# Patient Record
Sex: FEMALE | Race: WHITE
[De-identification: names, ages, dates, MRNs, and addresses within clinical notes are randomized per-mention and may not be internally consistent; named-entity substitution may affect disease eponyms.]

---

## 2017-02-04 ENCOUNTER — HOSPITAL ENCOUNTER (EMERGENCY)
Dept: HOSPITAL 68 - ERH | Age: 57
End: 2017-02-04
Payer: COMMERCIAL

## 2017-02-04 VITALS — WEIGHT: 155 LBS | BODY MASS INDEX: 23.49 KG/M2 | HEIGHT: 68 IN

## 2017-02-04 VITALS — DIASTOLIC BLOOD PRESSURE: 70 MMHG | SYSTOLIC BLOOD PRESSURE: 130 MMHG

## 2017-02-04 DIAGNOSIS — Z72.0: ICD-10-CM

## 2017-02-04 DIAGNOSIS — J40: Primary | ICD-10-CM

## 2017-02-04 NOTE — ED INFLUENZA/URI COMPLAINT
History of Present Illness
 
General
Chief Complaint: Upper Respiratory Sx/Fever
Stated Complaint: FLU SYMPTOMS
Source: patient
Exam Limitations: no limitations
 
Vital Signs & Intake/Output
Vital Signs & Intake/Output
 Vital Signs
 
 
Date Time Temp Pulse Resp B/P Pulse O2 O2 Flow FiO2
 
     Ox Delivery Rate 
 
02/04 1738 98.6 98 18 130/70 98 Room Air  
 
02/04 1653     98 Room Air  
 
02/04 1635 98.5 110 16 138/96 97 Room Air  
 
 
Allergies
Coded Allergies:
buspirone (From BUSPAR) (Intermediate, PER PT UNKNOWN STATES DOES NOT EXPERIENCE
DYSPNEA 12/08/16)
 
Reconcile Medications
Aripiprazole 2 MG TABLET   0.25 TAB PO DAILY MENTAL HEALTH  (Reported)
Azithromycin (Zithromax) 250 MG TABLET   1 DP PO AD bronchitis
     1 tab daily x 4 days
Clonidine HCl 0.1 MG TABLET   1 TAB PO TID PRN UNKNOWN  (Reported)
Diphenhydramine HCl 25 MG CAPSULE   1-2 CAP PO AD ALLERGIES/SLEEP  (Reported)
Guaifen/Phenyleph/Acetaminophn (Mucinex Fast-Max Cold-Sinus Tb) 200 MG-5 MG-325 
MG TABLET   2 CAP PO Q4H PRN COLD & SINUS  (Reported)
Ibuprofen 600 MG TABLET   1 TAB PO Q6 PRN PAIN
     with food
Methimazole 10 MG TABLET   1 TAB PO DAILY THYROID  (Reported)
Neomycn/Baci Zn/Pmyx Bs/Pramox (Triple Antibioti-Pain Rlf Oint) 3.5-10K-10 
OINT...G.   1 PETER TOP AD AFFECTED AREA(S)  (Reported)
Pantoprazole Sodium 40 MG TABLET.DR   1 TAB PO DAILY GI  (Reported)
Propranolol HCl 20 MG TABLET   1 TAB PO BID THYROID  (Reported)
Quetiapine Fumarate 200 MG TABLET   1 TAB PO QPM SLEEP HELP  (Reported)
Quetiapine Fumarate (Seroquel) 50 MG TABLET   0.5-1 TAB PO TID PRN MENTAL HEALTH
 (Reported)
Topiramate 50 MG TABLET   1 TAB PO TID MIGRAINES  (Reported)
 
Triage Note:
PT STATES SHE HAS FLU LIKE S/S SINCE YESTERDAY.
 PT STATES SHE HAS ACHES, COLD AND SORE THROAT
 AND IS FEELING DIZZY
Triage Nurses Notes Reviewed? yes
Onset: Abrupt
Duration: day(s): (2)
Timing: multiple episodes today
Severity: moderate
No Modifying Factors: none
Associated Symptoms: cough, sore throat, BODY PAIN, DYSPNEA
HPI:
This is a 56-year-old female presents to the ER with chief complaint of 
congestion, sore throat, cough, body aches and pains for the past 2 days.  She 
states she was also moving and states that her back pain may be secondary to 
that.  Denies any fevers.  She is a daily smoker.  Didn't is unsure about sick 
contacts.  She took some over-the-counter allergy medications without any 
relief.  She is coughing up green sputum.
 
Past History
 
Travel History
Traveled to [place] past 21 day No
 
Medical History
Any Pertinent Medical History? see below for history
Neurological: NONE
EENT: NONE
Cardiovascular: hypertension, hyperlipidemia
Respiratory: NONE
Gastrointestinal: NONE
Hepatic: NONE
Renal: NONE (HAD PRIOR STENT), RENAL STONES
Musculoskeletal: NONE
Psychiatric: anxiety, bipolar disease, substance abuse (Depakote overdose), PTSD
Endocrine: NONE
Blood Disorders: NONE
Cancer(s): NONE
GYN/Reproductive: PID
Other Medical Hx:
DERMATITIS CURRENT
History of MRSA: No
History of VRE: No
History of CDIFF: No
Influenza Vaccine: 10/01/15
 
Surgical History
Surgical History: none, non-contributory, N
 
Psychosocial History
Who do you live with Patient/Self
Services at Home None
What is your primary language English
Tobacco Use: Current Daily Use
Daily Tobacco Use Amount/Type: => 5 Cigarettes daily
ETOH Use: denies use
Illicit Drug Use: denies illicit drug use
 
Family History
Family History, If Any:
MOTHER
  FH: hypertension
  FHx: hyperthyroidism
FATHER
Relation not specified for:
  Kidney stones
 
Hx Contributory? No
 
Review of Systems
 
Review of Systems
Constitutional:
Reports: chills.  Denies: fever. 
EENTM:
Reports: no symptoms. 
Respiratory:
Reports: cough, sputum production.  Denies: short of breath. 
Cardiovascular:
Denies: chest pain, palpitations. 
GI:
Reports: no symptoms. 
Genitourinary:
Reports: no symptoms. 
Musculoskeletal:
Reports: back pain, muscle pain. 
Skin:
Reports: no symptoms. 
Neurological/Psychological:
Reports: no symptoms. 
Hematologic/Endocrine:
Denies: bruising, bleeding, polyuria, polydipsia. 
Immunologic/Allergic:
Denies: splenectomy. 
All Other Systems: Reviewed and Negative
 
Physical Exam
 
Physical Exam
General Appearance: well developed/nourished, alert, awake
Head: atraumatic, normal appearance
Eyes:
Bilateral: normal appearance, PERRL, EOMI. 
Ears, Nose, Throat: moist mucous membrane, hearing grossly normal, Tympanic 
normal, pharyngeal erythema
Neck: normal inspection, supple, full range of motion
Respiratory: normal breath sounds, chest non-tender
Cardiovascular: regular rate/rhythm
Peripheral Pulses:
2+ radial (R), 2+ radial (L)
Gastrointestinal: soft
Neurologic/Psych: no motor/sensory deficits, awake, alert, oriented x 3
Skin: intact, normal color, warm/dry
 
Core Measures
Severe Sepsis Present: No
Septic Shock Present: No
 
Progress
Differential Diagnosis: influenza, pneumonia, pharyngitis
Plan of Care:
 Orders
 
 
Procedure Date/time Status
 
THROAT CULTURE W/QUICK STREP 02/04 1657 Active
 
URINALYSIS 02/04 1647 Complete
 
RAPID VIRAL INFLUENZA A 02/04 1638 Complete
 
 
 Laboratory Tests
 
 
 
02/04/17 1648:
Urinalysis LIGHT  H, Urine Color YEL, Urine Clarity HAZY  H, Urine pH 6.5, Ur 
Specific Gravity 1.010, Urine Protein NEG, Urine Ketones NEG, Urine Nitrite NEG,
Urine Bilirubin NEG, Urine Urobilinogen 0.2, Ur Leukocyte Esterase SMALL  H, Ur 
Microscopic SEDIMENT EXAMINED, Urine RBC RARE, Urine WBC 1-3  H, Ur Epithelial 
Cells MOD  H, Urine Mucus FEW, Urine Hemoglobin TRACE-INTACT, Urine Glucose NEG
Initial ED EKG: none
 
Departure
 
Departure
Time of Disposition: 1726
Disposition: HOME OR SELF CARE
Condition: Stable
Clinical Impression
Primary Impression: Bronchitis
Referrals:
VALDEZ FERNANDEZ,MYL (PCP/Family)
 
Additional Instructions:
Take the azithromycin as directed.  Motrin as needed for pain/fever.  Follow up 
with your doctor in the office.  Stop smoking.  
Departure Forms:
Customer Survey
General Discharge Information
Prescriptions:
Current Visit Scripts
Azithromycin (Zithromax) 1 DP PO AD 
     #4 TAB 
     1 tab daily x 4 days
 
Ibuprofen 1 TAB PO Q6 PRN PAIN
     #30 TAB 
     with food

## 2017-02-08 ENCOUNTER — HOSPITAL ENCOUNTER (EMERGENCY)
Dept: HOSPITAL 68 - ERH | Age: 57
End: 2017-02-08
Payer: COMMERCIAL

## 2017-02-08 VITALS — WEIGHT: 155 LBS | HEIGHT: 68 IN | BODY MASS INDEX: 23.49 KG/M2

## 2017-02-08 VITALS — SYSTOLIC BLOOD PRESSURE: 159 MMHG | DIASTOLIC BLOOD PRESSURE: 100 MMHG
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
## 2017-02-08 DIAGNOSIS — S60.411A: Primary | ICD-10-CM
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
## 2017-02-11 ENCOUNTER — HOSPITAL ENCOUNTER (EMERGENCY)
Dept: HOSPITAL 68 - ERH | Age: 57
LOS: 1 days | End: 2017-02-12
Payer: COMMERCIAL

## 2017-02-11 DIAGNOSIS — F31.9: Primary | ICD-10-CM

## 2017-02-11 LAB
ABSOLUTE GRANULOCYTE CT: 4.6 /CUMM (ref 1.4–6.5)
BASOPHILS # BLD: 0.1 /CUMM (ref 0–0.2)
BASOPHILS NFR BLD: 1.2 % (ref 0–2)
EOSINOPHIL # BLD: 0 /CUMM (ref 0–0.7)
EOSINOPHIL NFR BLD: 0.2 % (ref 0–5)
ERYTHROCYTE [DISTWIDTH] IN BLOOD BY AUTOMATED COUNT: 23.5 % (ref 11.5–14.5)
GRANULOCYTES NFR BLD: 62.2 % (ref 42.2–75.2)
HCT VFR BLD CALC: 39.6 % (ref 37–47)
LITHIUM SERPL-SCNC: 0.4 MMOL/L (ref 0.6–1.2)
LYMPHOCYTES # BLD: 2.4 /CUMM (ref 1.2–3.4)
MCH RBC QN AUTO: 27.7 PG (ref 27–31)
MCHC RBC AUTO-ENTMCNC: 32.8 G/DL (ref 33–37)
MCV RBC AUTO: 84.4 FL (ref 81–99)
MONOCYTES # BLD: 0.3 /CUMM (ref 0.1–0.6)
PLATELET # BLD: 250 /CUMM (ref 130–400)
PMV BLD AUTO: 8.8 FL (ref 7.4–10.4)
RED BLOOD CELL CT: 4.69 /CUMM (ref 4.2–5.4)
WBC # BLD AUTO: 7.4 /CUMM (ref 4.8–10.8)

## 2017-02-11 PROCEDURE — G0463 HOSPITAL OUTPT CLINIC VISIT: HCPCS

## 2017-02-11 PROCEDURE — G0480 DRUG TEST DEF 1-7 CLASSES: HCPCS
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
## 2017-02-11 NOTE — ED DYSPNEA/ASTHMA COMPLAINT
**See Addendum**
History of Present Illness
 
General
Chief Complaint: Psychiatric Related Complaint
Stated Complaint: PARANOIA
Source: patient, old records, EMS
Exam Limitations: no limitations
 
Vital Signs & Intake/Output
Vital Signs & Intake/Output
 Vital Signs
 
 
Date Time Temp Pulse Resp B/P Pulse O2 O2 Flow FiO2
 
     Ox Delivery Rate 
 
02/11 1730      Room Air  
 
02/11 1552 98.2 100 16 151/95 94 Room Air  
 
 
Allergies
Coded Allergies:
buspirone (From BUSPAR) (Intermediate, PER PT UNKNOWN STATES DOES NOT EXPERIENCE
DYSPNEA 12/08/16)
 
Reconcile Medications
Aripiprazole 2 MG TABLET   0.25 TAB PO DAILY MENTAL HEALTH  (Reported)
Azithromycin (Zithromax) 250 MG TABLET   1 DP PO AD bronchitis
     1 tab daily x 4 days
Clonidine HCl 0.1 MG TABLET   1 TAB PO TID PRN UNKNOWN  (Reported)
Diphenhydramine HCl 25 MG CAPSULE   1-2 CAP PO AD ALLERGIES/SLEEP  (Reported)
Guaifen/Phenyleph/Acetaminophn (Mucinex Fast-Max Cold-Sinus Tb) 200 MG-5 MG-325 
MG TABLET   2 CAP PO Q4H PRN COLD & SINUS  (Reported)
Ibuprofen 600 MG TABLET   1 TAB PO Q6 PRN PAIN
     with food
Methimazole 10 MG TABLET   1 TAB PO DAILY THYROID  (Reported)
Neomycn/Baci Zn/Pmyx Bs/Pramox (Triple Antibioti-Pain Rlf Oint) 3.5-10K-10 
OINT...G.   1 PETER TOP AD AFFECTED AREA(S)  (Reported)
Pantoprazole Sodium 40 MG TABLET.DR   1 TAB PO DAILY GI  (Reported)
Propranolol HCl 20 MG TABLET   1 TAB PO BID THYROID  (Reported)
Quetiapine Fumarate 200 MG TABLET   1 TAB PO QPM SLEEP HELP  (Reported)
Quetiapine Fumarate (Seroquel) 50 MG TABLET   0.5-1 TAB PO TID PRN MENTAL HEALTH
 (Reported)
Topiramate 50 MG TABLET   1 TAB PO TID MIGRAINES  (Reported)
 
Core Measure Meds Pre-Hospital antibiotics
Triage Note:
PT BIBA FOR PARANOIA AND +AH/VH. PT CALM AND
COOPERATIVE. PER EMS, PT HAS SHINGLES ON HER UPPER
LIP.
Triage Nurses Notes Reviewed? yes
Onset: Just prior to arrival
Duration: day(s):, constant, continues in ED
Timing: recent history
Severity: moderate
Activities at Onset: emotional stress
Prior Episodes/Possible Cause: unknown cause
Associated Symptoms: anxiety, insomnia, loss of appetite
LMP (ages 10-50): post menopausal
Pregnant: No
Patient currently breastfeeds: No
HPI:
Patient complains of being depressed and scared of roommate reportedly entering 
her room.  She also has insomnia loss appetite and hears voices.
She denies fever chills nausea vomiting diarrhea abdominal pain chest pain chest
breath headache dysuria bleeding suicidal ideation homicidal ideation.
 
Past History
 
Medical History
Any Pertinent Medical History? see below for history
Neurological: NONE
EENT: NONE
Cardiovascular: hypertension, hyperlipidemia
Respiratory: NONE
Gastrointestinal: NONE
Hepatic: NONE
Renal: RENAL STONES W/ STENTS
Musculoskeletal: NONE
Psychiatric: anxiety, bipolar disease, substance abuse (Depakote overdose), PTSD
Endocrine: NONE
Blood Disorders: NONE
Cancer(s): NONE
GYN/Reproductive: PID
History of MRSA: No
History of VRE: No
History of CDIFF: No
 
Surgical History
Surgical History: none, non-contributory, N
 
Psychosocial History
Who do you live with Patient/Self
Services at Home None
What is your primary language English
 
Family History
Family History, If Any:
MOTHER
  FH: hypertension
  FHx: hyperthyroidism
FATHER
Relation not specified for:
  Kidney stones
 
Hx Contributory? No
 
Review of Systems
 
Review of Systems
Constitutional:
Reports: no symptoms. 
EENTM:
Reports: no symptoms. 
Respiratory:
Reports: no symptoms. 
Cardiovascular:
Reports: no symptoms. 
GI:
Reports: no symptoms. 
Genitourinary:
Reports: no symptoms. 
Musculoskeletal:
Reports: no symptoms. 
Skin:
Reports: see HPI, rash. 
Neurological/Psychological:
Reports: see HPI, anxiety, confusion. 
Hematologic/Endocrine:
Reports: no symptoms. 
Immunologic/Allergic:
Reports: no symptoms. 
All Other Systems: Reviewed and Negative
 
Physical Exam
 
Physical Exam
General Appearance: well developed/nourished, alert, awake, anxious, mild 
distress
Head: atraumatic, normal appearance
Eyes:
Bilateral: normal appearance, PERRL, EOMI. 
Ears, Nose, Throat: normal pharynx, normal ENT inspection
Neck: normal inspection, supple, full range of motion, no midline tenderness
Respiratory: normal breath sounds, chest non-tender, no respiratory distress, 
quiet respiration, lungs clear
Cardiovascular: regular rate/rhythm, normal peripheral pulses, norml femoral 
pulses equa
Peripheral Pulses:
4+ carotid (R), 4+ carotid (L)
Gastrointestinal: normal bowel sounds, soft, non-tender, no organomegaly
Extremities: normal inspection, normal capillary refill, normal range of motion,
no edema
Neurologic/Psych: no motor/sensory deficits, awake, alert, oriented x 3, CNs II-
XII nml as tested
Skin: intact, normal color, warm/dry, rash, patches of erythematous rash on skin
on upper lip philtrum area
Lymphatic: no anterior cervical murray
 
Core Measures
ACS in differential dx? No
Severe Sepsis Present: No
Septic Shock Present: No
 
Progress
Differential Diagnosis: psychosis depression
Plan of Care:
 Orders
 
 
Procedure Date/time Status
 
Regular Diet 02/12 D Active
 
Patient Safety Monitor 02/11 1526 Active
 
URINE DRUG SCREEN FOR ER ONLY 02/11 1526 Complete
 
URINALYSIS 02/11 1526 Complete
 
LITHIUM 02/11 1526 Complete
 
ETHANOL 02/11 1526 Complete
 
COMPREHENSIVE METABOLIC PANEL 02/11 1526 Complete
 
CBC WITHOUT DIFFERENTIAL 02/11 1526 Complete
 
ED CRISIS PSYCH CONSULT 02/11 1526 Active
 
 
 Current Medications
 
 
  Sig/Jerel Start time  Last
 
Medication Dose  Stop Time Status Admin
 
Omeprazole 40 MG DAILY AC 02/12 0700 UNVr 
 
(Prilosec)   02/12 0701  
 
Propranolol HCl 20 MG BID 02/11 2200 UNVr 
 
(Inderal 20 MG    02/12 1001  
 
Tablet)     
 
Lithium Carbonate 300 MG 0800,2000 02/11 2000 UNVr 
 
(Lithium Carbonate)   02/12 2001  
 
Diphenhydramine HCl 25 MG AT BEDTIME PRN 02/11 1800 UNVr 
 
(Benadryl)   02/11 1801  
 
Ibuprofen 600 MG 4 TIMES/DAY PRN 02/11 1800 UNVr 
 
(Motrin)   02/12 2201  
 
Topiramate 50 MG TID 02/11 1755 UNVr 
 
(Topamax)   02/12 1601  
 
Quetiapine Fumarate 50 MG TID 02/11 1754 UNVr 
 
(SEROquel)   02/12 2201  
 
Clonidine 0.1 MG TID 02/11 1751 UNVr 
 
(Catapres)   02/12 2201  
 
 
 Laboratory Tests
 
 
 
02/11/17 1610:
Urine Opiates Screen < 100.00, Methadone Screen 57, Barbiturate Screen < 60, Ur 
Phencyclidine Scrn 7.70, Amphetamines Screen < 100, U Benzodiazepines Scrn < 85,
Urine Cocaine Screen < 50, Urine Cannabis Screen < 5.00, Urine Color YEL, Urine 
Clarity CLEAR, Urine pH 6.0, Ur Specific Gravity 1.010, Urine Protein NEG, Urine
Ketones NEG, Urine Nitrite NEG, Urine Bilirubin NEG, Urine Urobilinogen 0.2, Ur 
Leukocyte Esterase NEG, Ur Microscopic EXAM NOT REQUIRED, Urine Hemoglobin NEG, 
Urine Glucose NEG
 
02/11/17 1541:
Anion Gap 9, Estimated GFR 47  L, BUN/Creatinine Ratio 13.3, Glucose 74, Calcium
9.6, Total Bilirubin 0.6, AST 26, ALT 34, Alkaline Phosphatase 229  H, Total 
Protein 6.7, Albumin 3.9, Globulin 2.8, Albumin/Globulin Ratio 1.4, CBC w Diff 
NO MAN DIFF REQ, RBC 4.69, MCV 84.4, MCH 27.7, RDW 23.5  H, MPV 8.8, Gran % 62.2
, Lymphocytes % 32.4, Monocytes % 4.0, Eosinophils % 0.2, Basophils % 1.2, 
Absolute Granulocytes 4.6, Absolute Lymphocytes 2.4, Absolute Monocytes 0.3, 
Absolute Eosinophils 0, Absolute Basophils 0.1, PUBS MCHC 32.8  L, Lithium 0.4  
L, Serum Alcohol < 10.0
Initial ED EKG: none
Hand-Off
   Endorsed To:
SAMANTHA FERNANDEZ,JW REICH
   Endorsed Time: 1900
   Pending: other (re eval in AM, female shelter)
 
Departure
 
Departure
Disposition: STILL A PATIENT
Condition: Stable
Clinical Impression
Primary Impression: Bipolar disorder
Qualifiers:  Active/Remission status: currently active Current bipolar episode 
type: depressed Current episode severity: unspecified Qualified Code: F31.30 - 
Bipolar disorder, current episode depressed, mild or moderate severity, 
unspecified
Referrals:
VALDEZ FERNANDEZ,MYAYE (PCP/Family)
 
Departure Forms:
Customer Survey
General Discharge Information
 
Critical Care Note
 
Critical Care Note
Critical Care Time: non-applicable

## 2017-02-12 VITALS — SYSTOLIC BLOOD PRESSURE: 135 MMHG | DIASTOLIC BLOOD PRESSURE: 95 MMHG

## 2017-04-04 ENCOUNTER — HOSPITAL ENCOUNTER (EMERGENCY)
Dept: HOSPITAL 68 - ERH | Age: 57
End: 2017-04-04
Payer: COMMERCIAL

## 2017-04-04 VITALS — DIASTOLIC BLOOD PRESSURE: 80 MMHG | SYSTOLIC BLOOD PRESSURE: 140 MMHG
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
## 2017-04-04 DIAGNOSIS — S32.010A: Primary | ICD-10-CM
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
## 2017-04-04 NOTE — RADIOLOGY REPORT
EXAMINATION:
XR LUMBOSACRAL SPINE
 
CLINICAL INFORMATION:
Back pain.
 
COMPARISON:
CT 11/16/2015
 
TECHNIQUE:
4 views of the lumbosacral spine were obtained.
 
FINDINGS:
Severe L5-S1 degenerative disc disease. Moderate degenerative disc disease at
L3-L4 with an inferior endplate erosion or Schmorl's node of L3 with
surrounding sclerosis. Slight L3-L4 retrolisthesis.
 
There is a superior endplate fracture of L1 with slight depression anteriorly
which appears chronic due to the presence of sclerosis.
 
IMPRESSION:
There is a superior endplate fracture of L1 with mild depression anteriorly
which appears chronic. However, this is new when compared with 11/16/2015.
Correlate for tenderness in this location.
 
Moderate L3-L4 degenerative disc disease with L3 inferior endplate Schmorl's
node or chronic erosion and surrounding sclerosis. Minimal retrolisthesis.
 
Severe L5-S1 degenerative disc disease.
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
## 2017-04-17 ENCOUNTER — HOSPITAL ENCOUNTER (EMERGENCY)
Dept: HOSPITAL 68 - ERH | Age: 57
Discharge: STILL ADMITTED OR ADMINISTRATIVE | End: 2017-04-17
Payer: COMMERCIAL

## 2017-04-17 VITALS — DIASTOLIC BLOOD PRESSURE: 95 MMHG | SYSTOLIC BLOOD PRESSURE: 169 MMHG

## 2017-04-17 VITALS — WEIGHT: 160 LBS | BODY MASS INDEX: 24.25 KG/M2 | HEIGHT: 68 IN

## 2017-04-17 DIAGNOSIS — S60.212A: Primary | ICD-10-CM

## 2017-05-01 ENCOUNTER — HOSPITAL ENCOUNTER (INPATIENT)
Dept: HOSPITAL 68 - ERH | Age: 57
LOS: 7 days | DRG: 885 | End: 2017-05-08
Attending: PSYCHIATRY & NEUROLOGY | Admitting: PSYCHIATRY & NEUROLOGY
Payer: COMMERCIAL

## 2017-05-01 VITALS — HEIGHT: 66 IN | WEIGHT: 144.12 LBS | BODY MASS INDEX: 23.16 KG/M2

## 2017-05-01 DIAGNOSIS — F17.200: ICD-10-CM

## 2017-05-01 DIAGNOSIS — Z87.898: ICD-10-CM

## 2017-05-01 DIAGNOSIS — F31.64: Primary | ICD-10-CM

## 2017-05-01 DIAGNOSIS — F43.10: ICD-10-CM

## 2017-05-01 LAB
ABSOLUTE GRANULOCYTE CT: 4.4 /CUMM (ref 1.4–6.5)
BASOPHILS # BLD: 0.1 /CUMM (ref 0–0.2)
BASOPHILS NFR BLD: 1.1 % (ref 0–2)
EOSINOPHIL # BLD: 0.1 /CUMM (ref 0–0.7)
EOSINOPHIL NFR BLD: 1.4 % (ref 0–5)
ERYTHROCYTE [DISTWIDTH] IN BLOOD BY AUTOMATED COUNT: 16.4 % (ref 11.5–14.5)
GRANULOCYTES NFR BLD: 65.6 % (ref 42.2–75.2)
HCT VFR BLD CALC: 40.3 % (ref 37–47)
LITHIUM SERPL-SCNC: 0.5 MMOL/L (ref 0.6–1.2)
LYMPHOCYTES # BLD: 1.9 /CUMM (ref 1.2–3.4)
MCH RBC QN AUTO: 31.6 PG (ref 27–31)
MCHC RBC AUTO-ENTMCNC: 33.9 G/DL (ref 33–37)
MCV RBC AUTO: 93.4 FL (ref 81–99)
MONOCYTES # BLD: 0.3 /CUMM (ref 0.1–0.6)
PLATELET # BLD: 157 /CUMM (ref 130–400)
PMV BLD AUTO: 9.5 FL (ref 7.4–10.4)
RED BLOOD CELL CT: 4.32 /CUMM (ref 4.2–5.4)
WBC # BLD AUTO: 6.6 /CUMM (ref 4.8–10.8)

## 2017-05-01 PROCEDURE — G0480 DRUG TEST DEF 1-7 CLASSES: HCPCS

## 2017-05-01 PROCEDURE — G0463 HOSPITAL OUTPT CLINIC VISIT: HCPCS
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
## 2017-05-01 NOTE — NUR
PT MEDICATED WITH SEROQUEL 50MG AND NEURONTIN 300MG PO PER EMAR. 
 
PT CALM AND COOPERATIVE, WATCHING TV IN ROOM 14. SITTER AT DOOR.

## 2017-05-01 NOTE — ED PSY CRISIS COLLATERAL NOTE
Collateral Note
 
Collateral Note
Family/Inform/Elissa Contacts:
Essie of [facility] called to inform that Pt was seen by mobile crisis and sent to
the ED on a SWEC. Pt has not been compliant with her tx or meds at [facility]. 
Sometimes she will take extra meds and other time not enough. Pt has been 
increasingly paranoid. She calls the police frequently stating that people are 
breaking into her home and stealing things. She says that people are after her 
and left a shovel in her yard to threaten her.Police told mobile crisis that if 
pt does not go to the hospital they will arrest her for false reports. Pt also 
thinks she has bugs and uses lice shampoo, but no bugs have been found. Pt lives
in supervised living and was kicked out of her last place due to her 
delusions.Pt was hospitalized in Dec for SI.

## 2017-05-01 NOTE — NUR
PT ASLEEP AT THIS TIME ON HER RIGHT SIDE. RESPIRATIONS EQUAL AND UNLABORED.
SITTER AT DOOR FOR SAFETY.

## 2017-05-01 NOTE — NUR
Crisis evaluation completed, pt is a hold over and bedsearch.  There are no
beds on CPS at this time.

## 2017-05-01 NOTE — NUR
PT MEDICATED WITH TOPAMAX 50MG PO AND NICODERM 21MG PATCH APPLIED TO UPPER LEFT
ARM. PT REMAINS CALM AND COOPERATIVE. SITTER AT DOOR.

## 2017-05-01 NOTE — NUR
PT SENT ON PAPER FROM HOME BY  CARE WORKER WHO WENT TO HER HOUSE. PER PAPER,
PT HAS BEEN HAVING PARANOID DELUSIONS THAT "SOMEONE IS MESSING WITH HER". PT
STATES THAT THIS MORNING SHE COULDN'T FIND A $10 BILL THAT SHE PUT IN HER PURSE
LAST NIGHT. PT ALSO STATES THAT LAST NIGHT WHEN SHE GOT INTO BED SHE FOUND HER
MEDICATIONS SCATTERRED UNDER HER COVERS. PT DENIES SI/HI.
 
PT STATES THAT SHE HAS BEEN SOBER FOR 4 YEARS FROM DRUGS AND ETOH. PT REPORTS
BEING MED COMPLIANT AND THAT SHE HAS BEEN SLEEPING WELL.

## 2017-05-01 NOTE — ED PSYCHIATRIC COMPLAINT
History of Present Illness
 
General
Chief Complaint: Psychiatric Related Complaint
Stated Complaint: PSYCH EVAL
Source: patient, old records, EMS, transportation certificate and evaluation in 
a hospital for psychiatric disablities
Exam Limitations: no limitations
 
Vital Signs & Intake/Output
Vital Signs & Intake/Output
 Vital Signs
 
 
Date Time Temp Pulse Resp B/P B/P Pulse O2 O2 Flow FiO2
 
     Mean Ox Delivery Rate 
 
05/02 1645 98.4 82 16 130/90  97 Room Air  
 
05/02 1630       Room Air  
 
05/02 1459  82 16 130/90  97 Room Air  
 
05/02 1209 96.7 76 16 131/96  97 Room Air  
 
05/02 1159       Room Air  
 
05/02 0921 97.5 82 20 169/99     
 
05/02 0911 97.5 82 20 169/99  99 Room Air  
 
05/02 0630 97.7 78 18 168/96  96 Room Air  
 
05/01 2237 98.4 84 16 136/70  94 Room Air  
 
05/01 2026       Room Air  
 
05/01 1732 98.7 95 20 130/69  94 Room Air  
 
 
Allergies
Coded Allergies:
buspirone (From BUSPAR) (Intermediate, PER PT UNKNOWN STATES DOES NOT EXPERIENCE
DYSPNEA 12/08/16)
 
Reconcile Medications
Amoxicillin/Clavulanate Potass (Amox-Clav 875-125 MG Tablet) 875 MG-125 MG 
TABLET   1 TAB PO BID ANTIBIOTIC  (Reported)
Aripiprazole 2 MG TABLET   0.25 TAB PO DAILY MENTAL HEALTH  (Reported)
Cholecalciferol (Vitamin D3) (Vitamin D) 2,000 UNIT CAPSULE   1 CAP PO DAILY 
SUPPLEMENT  (Reported)
Gabapentin 300 MG CAPSULE   1 CAP PO TID ANXIETY  (Reported)
Lithium Carbonate (Lithium Carbonate ER) 300 MG TABLET.ER   1 TAB PO DAILY 
MENTAL HEALTH  (Reported)
Losartan Potassium (Unknown Strength) TABLET   (Unknown Dose) UNKNOWN  (Reported
)
Methimazole 10 MG TABLET   1 TAB PO DAILY THYROID  (Reported)
Multiple Vitamin (Multivitamins) 1 EACH TABLET   1 TAB PO DAILY SUPPLEMENT  (
Reported)
Pantoprazole Sodium 40 MG TABLET.DR   1 TAB PO DAILY GI  (Reported)
Propranolol HCl 20 MG TABLET   1 TAB PO BID THYROID  (Reported)
Quetiapine Fumarate 100 MG TABLET   1 TAB PO QPM SLEEP  (Reported)
Quetiapine Fumarate (Seroquel) 50 MG TABLET   1 TAB PO TID MENTAL HEALTH  (
Reported)
Sertraline HCl 100 MG TABLET   2 TAB PO DAILY MENTAL HEALTH  (Reported)
Topiramate 50 MG TABLET   1 TAB PO TID MIGRAINES  (Reported)
 
Triage Note:
PT BIBA FOR PSYCH EVAL. PT SEEN AT [facility] THIS
 MORNING. PT IS ON APRN/LCSW PAPER. PT WAS SEEN AT
 HER HOME BY [facility] TODAY. PT DENIES SI OR HI.
Triage Nurses Notes Reviewed? yes
HPI:
Patient is a 56-year-old female with past medical history of bipolar disorder 
brought in by ambulance on a emergency transportation certificate and evaluation
in the hospital for psychiatric disabilities form.  Patient's  
called 911 with concerns that patient is delusional and noncompliant with her 
medications.  Patient reports that she believes that "someone is messing with 
her".  She awoke yesterday with 5 pills scattered in her bed that she did not 
place there.  Patient also reports 10 hours missing from her pocket book when 
she went to buy coffee today.  Patient was changing the locks on her door due to
the concern that someone was breaking into her apartment.  When she told her 
 this she reports that her  began arguing with her and
then called 911.  Patient reports that she has been compliant with her 
medications.  Patient reports she has been clean from alcohol and drugs for the 
past 4 years.  Patient reports that she has been sleeping normally, eating and 
drinking normally and completing her activities of daily living without a 
problem.  Patient denies suicidal or homicidal ideation.  Patient denies 
hallucinations.
(STEF TAO)
 
Past History
 
Medical History
Any Pertinent Medical History? see below for history
Neurological: NONE
EENT: NONE
Cardiovascular: hypertension, hyperlipidemia
Respiratory: NONE
Gastrointestinal: NONE
Hepatic: NONE
Renal: RENAL STONES W/ STENTS
Musculoskeletal: NONE
Psychiatric: anxiety, bipolar disease, substance abuse (Depakote overdose), PTSD
Endocrine: hyperthyroidism
Blood Disorders: NONE
Cancer(s): skin cancer
GYN/Reproductive: PID
History of MRSA: No
History of VRE: No
History of CDIFF: No
 
Surgical History
Surgical History: hysterectomy, N
 
Psychosocial History
Who do you live with Patient/Self
Services at Home None
What is your primary language English
 
Family History
Family History, If Any:
MOTHER
  FH: hypertension
  FHx: hyperthyroidism
FATHER
Relation not specified for:
  Kidney stones
 
Hx Contributory? No
(STEF TAO)
 
Review of Systems
 
Review of Systems
Constitutional:
Denies: chills, fever. 
EENTM:
Denies: visual changes. 
Respiratory:
Denies: cough, short of breath. 
Cardiovascular:
Denies: chest pain. 
GI:
Denies: abdominal pain, diarrhea, vomiting. 
Genitourinary:
Reports: no symptoms. 
Musculoskeletal:
Reports: back pain (since back fracture 1.5 months). 
Neurological/Psychological:
Reports: see HPI. 
Hematologic/Endocrine:
Reports: no symptoms. 
Immunologic/Allergic:
Reports: no symptoms. 
(STEF TAO)
 
Physical Exam
 
Physical Exam
General Appearance: well developed/nourished, alert, awake
Head: atraumatic, normal appearance
Eyes:
Bilateral: normal appearance, PERRL, EOMI. 
Ears, Nose, Throat: normal pharynx, normal ENT inspection, hearing grossly 
normal
Neck: normal inspection, supple, full range of motion
Respiratory: normal breath sounds, chest non-tender, no respiratory distress, 
lungs clear
Cardiovascular: regular rate/rhythm
Gastrointestinal: soft, non-tender
Extremities: normal range of motion, no signs of trauma
Neurological/Psychiatric: no motor/sensory deficits, awake, alert, calm, CNs II-
XII nml as tested
Thoughts/Hallucinations: paranoid
Skin: normal color, warm/dry
SAD PERSONS Done? patient not suicidal, crisis consultation obtained
(STEF TAO)
 
Progress
Differential Diagnosis: drug intoxication, drug overdose, drug withdrawal, 
electrolyte abnormality, IC hem/mass/tumor, mood disorder, personality disorder,
psychosis
Plan of Care:
 Orders
 
 
Procedure Date/time Status
 
Regular Diet 05/02 B Active
 
Patient Safety Monitor 05/02 1324 Active
 
Admit to inpatient psych 05/02 1126 Active
 
Intake & Output 05/01 1502 Active
 
 
 Current Medications
 
 
  Sig/Jerel Start time  Last
 
Medication Dose  Stop Time Status Admin
 
Quetiapine Fumarate 100 MG QPM 05/02 2200 AC 
 
(Seroquel)     
 
Aripiprazole 0.5 MG DAILY 05/02 1000 CAN 
 
(Abilify)     
 
Gabapentin 300 MG TID 05/02 1000 UNVr 05/02
 
(Neurontin)     1628
 
Lithium Carbonate 300 MG DAILY 05/02 1000 UNVr 05/02
 
(Lithobid Slow      0921
 
Release)     
 
Multivitamins  1 TAB DAILY 05/02 1000 AC 
 
Therapeutic     
 
(Theragran-M      
 
Vitamins Tabs)     
 
Propranolol HCl 20 MG BID 05/02 1000 AC 05/02
 
(Inderal 20 MG      0921
 
Tablet)     
 
Quetiapine Fumarate 50 MG TID 05/02 1000 AC 05/02
 
(SEROquel)     1628
 
Sertraline HCl 200 MG DAILY 05/02 1000 AC 05/02
 
(Zoloft)     0921
 
 
5/1/2017 8:51:03 PM: Patient evaluated by crisis clinician.  Plan for patient to
remain in the emergency department overnight and be reevaluated in the morning
(STEF TAO)
Hand-Off
   Endorsed To:
MT SALINAS MD
   Endorsed Time: 0100
   Pending: consult (crisis re-evaluation)
(STEF TAO)
 
Departure
 
Departure
Disposition: STILL A PATIENT
Condition: Stable
Clinical Impression
Primary Impression: Bipolar disorder
Referrals:
VALDEZ FERNANDEZMYAYE (PCP/Family)
 
Departure Forms:
Customer Survey
General Discharge Information
(STEF TAO)
 
Psych Admission Note
Psychiatric Admission:
I have seen and evaluated MYLES CARDOZA.
 
I have also reviewed all the pertinent lab results and diagnostic results.
 
MYLES CARDOZA will be admitted to our inpatient Psychiatric unit for 
treatment and care.
 
 
PA/NP Co-Sign Statement
Statement:
ED Attending supervision documentation-
 
x I saw and evaluated the patient. I have also reviewed all the pertinent lab 
results and diagnostic results. I agree with the findings and the plan of care 
as documented in the PA's/NP's documentation. 
 
[] I have reviewed the ED Record and agree with the PA's/NP's documentation.
 
[] Additions or exceptions (if any) to the PAs/NP's note and plan are 
summarized below:
[]
 
(LUIS BHATIA MD)
 
as documented in the PA's/NP's documentation. 
 
[] I have reviewed the ED Record and agree with the PA's/NP's documentation.
 
[] Additions or exceptions (if any) to the PAs/NP's note and plan are 
summarized below:
[]
 
(LUIS BHATIA MD)

## 2017-05-02 VITALS — DIASTOLIC BLOOD PRESSURE: 64 MMHG | SYSTOLIC BLOOD PRESSURE: 137 MMHG

## 2017-05-02 VITALS — SYSTOLIC BLOOD PRESSURE: 143 MMHG | DIASTOLIC BLOOD PRESSURE: 86 MMHG

## 2017-05-02 NOTE — NUR
ASSUMED CARE OF THIS PT FROM BISHNU MESA. PT RESTING ON HER RIGHT SIDE. PT CALM AND
COOPERATIVE. SITTER AT DOOR. PT AWAITING TRANSFER TO [facility].

## 2017-05-02 NOTE — NUR
PT MEDICATED WITH NEURONTIN 300MG AND SEROQUEL 50MG PER EMAR. PT CALM AND
COOPERATIVE. SITTER AT DOOR. PT AWAITING TRANSFER DOWN TO [facility].

## 2017-05-02 NOTE — NUR
PT AMBULATORY TO RESTROOM WITH STEADY GAIT. PT CALM AND COOPERATIVE. SITTER AT
DOOR. AWAITING TRANSFER TO [facility].

## 2017-05-02 NOTE — NUR
PT SIDE LYING AT THIS TIME; SLEEPING AT THIS TIME WITH REGULAR RESPIRATIONS
NOTED, EQUAL CHEST RISE AND FALL.
SITTER REMAINS PRESENT
AWAITING CRISIS DISPO
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
## 2017-05-02 NOTE — NUR
MEDICATED WITH ALL DAILY MEDS, INCLUDING TYLENOL FOR HEADACHE.  PT REFUSING
MULTI VITAMIN STATING IT MAKES HER NAUSEOUS.  C/O NAUSEA BUT ABLE TO TOLERATE
ALL PILLS.  GINGER ALE PROVIDED.
REMAINS CALM AND COOPERATIVE.  
SITTER PRESENT
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
## 2017-05-02 NOTE — ED PSY CRISIS COLLATERAL NOTE
Collateral Note
 
Collateral Note
Family/Inform/Elissa Contacts:
Luba notes this is not her baseline, her [place] is increasingly in [place],
increasingly paranoid and is usually depressed.

## 2017-05-03 VITALS — SYSTOLIC BLOOD PRESSURE: 149 MMHG | DIASTOLIC BLOOD PRESSURE: 95 MMHG

## 2017-05-03 VITALS — SYSTOLIC BLOOD PRESSURE: 120 MMHG | DIASTOLIC BLOOD PRESSURE: 89 MMHG

## 2017-05-03 VITALS — SYSTOLIC BLOOD PRESSURE: 123 MMHG | DIASTOLIC BLOOD PRESSURE: 84 MMHG

## 2017-05-03 VITALS — SYSTOLIC BLOOD PRESSURE: 120 MMHG | DIASTOLIC BLOOD PRESSURE: 73 MMHG

## 2017-05-03 NOTE — HISTORY & PHYSICAL
General Information and HPI
History of Present Illness:
56F PMH bipolar disorder, hyperthyroidism, GERD admitted to [facility] after being
brought in by mobile crisis team under PEC.  Patient is paranoid but not 
agitated.  She appears calm and expresses a desire to go home, and does not seem
to understand why she is in the hospital.  She reports an urgency to be 
discharged as she has to have surgery for recently diagnosed skin cancer with 
Dr. Hammer in [place] on 5/10/17.  She indeed has a skin abnormality on her 
right temple that has the potential for malignancy.  She is otherwise well and 
has no physical complaints, other than fatigue since she was admitted, though 
she concedes she may just feel more relaxed being in this environment.
 
 
 
Allergies/Medications
Allergies:
Coded Allergies:
buspirone (From BUSPAR) (Intermediate, PER PT UNKNOWN STATES DOES NOT EXPERIENCE
DYSPNEA 12/08/16)
 
Home Med list
Cholecalciferol (Vitamin D3) (Vitamin D) 2,000 UNIT CAPSULE   1 CAP PO DAILY 
SUPPLEMENT  (Reported)
Gabapentin 300 MG CAPSULE   1 CAP PO TID ANXIETY  (Reported)
Lithium Carbonate (Lithium Carbonate ER) 300 MG TABLET.ER   1 TAB PO DAILY 
MENTAL HEALTH  (Reported)
Losartan Potassium (Unknown Strength) TABLET   (Unknown Dose) UNKNOWN  (Reported
)
Methimazole 10 MG TABLET   1 TAB PO DAILY THYROID  (Reported)
Multiple Vitamin (Multivitamins) 1 EACH TABLET   1 TAB PO DAILY SUPPLEMENT  (
Reported)
Pantoprazole Sodium 40 MG TABLET.DR   1 TAB PO DAILY GI  (Reported)
Propranolol HCl 20 MG TABLET   1 TAB PO BID THYROID  (Reported)
Quetiapine Fumarate 100 MG TABLET   1 TAB PO QPM SLEEP  (Reported)
Quetiapine Fumarate (Seroquel) 50 MG TABLET   1 TAB PO TID MENTAL HEALTH  (
Reported)
Sertraline HCl 100 MG TABLET   2 TAB PO DAILY MENTAL HEALTH  (Reported)
Topiramate 50 MG TABLET   1 TAB PO TID MIGRAINES  (Reported)
 
 
Past History
 
Travel History
Traveled to [place] past 21 day No
 
Medical History
Neurological: NONE
EENT: NONE
Cardiovascular: hypertension, hyperlipidemia
Respiratory: NONE
Gastrointestinal: NONE
Hepatic: NONE
Renal: RENAL STONES W/ STENTS
Musculoskeletal: NONE
Psychiatric: anxiety, bipolar disease, substance abuse (Depakote overdose), PTSD
Endocrine: hyperthyroidism
Blood Disorders: NONE
Cancer(s): skin cancer
GYN/Reproductive: PID
History of MRSA: No
History of VRE: No
History of CDIFF: No
 
Surgical History
Surgical History: hysterectomy, N
 
Past Family/Social History
 
Family History
Relations & Conditions if any
MOTHER
  FH: hypertension
  FHx: hyperthyroidism
FATHER
Relation not specified for:
  Kidney stones
 
 
Psychosocial History
Services at Home: None
Primary Language: English
ETOH Use: denies use
Illicit Drug Use: denies illicit drug use
 
Functional Ability
Ambulation: independent
 
Review of Systems
 
Review of Systems
Constitutional:
Reports: no symptoms. 
EENTM:
Reports: no symptoms. 
Cardiovascular:
Reports: no symptoms. 
Respiratory:
Reports: no symptoms. 
GI:
Reports: no symptoms. 
Genitourinary:
Reports: no symptoms. 
Musculoskeletal:
Reports: no symptoms. 
Skin:
Reports: see HPI. 
Neurological/Psychological:
Reports: see HPI. 
Hematologic/Endocrine:
Reports: no symptoms. 
Immunologic/Allergic:
Reports: no symptoms. 
All Other Systems: Reviewed and Negative
 
Exam & Diagnostic Data
Last 24 Hrs of Vital Signs/I&O
 Vital Signs
 
 
Date Time Temp Pulse Resp B/P B/P Pulse O2 O2 Flow FiO2
 
     Mean Ox Delivery Rate 
 
05/03 0826 95.9 94  120/89     
 
05/03 0813  87  143/86     
 
05/02 2146    143/86     
 
05/02 2028 97.2 87  143/86     
 
05/02 1752 98.1 97  137/64     
 
05/02 1645 98.4 82 16 130/90  97 Room Air  
 
05/02 1630       Room Air  
 
05/02 1459  82 16 130/90  97 Room Air  
 
05/02 1209 96.7 76 16 131/96  97 Room Air  
 
05/02 1159       Room Air  
 
 
 
 
Physical Exam
General Appearance Alert, Oriented X3, Cooperative, No Acute Distress
Skin 2cm mildly erythematous plaque on right temple
HEENT Mucous Membr. moist/pink
Neck Supple
Cardiovascular Regular Rate
Lungs Normal Air Movement
Abdomen Soft
Neurological
   Exam Findings: Grossly normal
   Cranial Nerves II through XII:
Grossly normal
Extremities No Edema
Vascular Normal Pulses
Last 24 Hrs of Labs/Hung:
 Laboratory Tests
 
05/02/17 2009:
TSH Cancelled, Free T4 Cancelled, Thyroxine (T4) Cancelled
 
 
Assessment/Plan
Assessment:
56F PMH bipolar disorder, hyperthyroidism, GERD, recently diagnosed skin cancer 
admitted to [facility] due to paranoia, delusional thinking, and inability to 
function on her own.  She is asymptomatic from a medical standpoint, save for 
the patch of abnormal skin that the patient reports is skin cancer on her right 
temple.  She is clinically euthyroid.
 
Plan
- Management by psychiatry
- Continue Tapazole and Propranolol for hyperthyroidism
- Does not require endocrine consult at this time, can follow up as outpatient
- Continue Omeprazole for GERD
- Would aim to have patient see her dermatologist on May 10th, or at the very 
least reschedule to have her seen soon after eventual discharge
- Continue home medications
- Ambulatory for DVT PPx
 
As Ranked By This Provider
Problem List:
 1. Bipolar 1 disorder
 
 2. Anxiety and depression
 
 3. Paranoid delusion
 
 4. GERD (gastroesophageal reflux disease)
 
 5. Skin cancer of face
 
 6. Hyperthyroidism
 
 
Miscellaneous
 
Miscellaneous Documentation
Attending Case Discussed With:
MOOKIE MOBLEY MD
 
Primary Care Physician:
VALDEZ FERNANDEZ,MYL
 
Patient sees these Specialists
Charmaine Vizcaino MD
Level of Patient Care:  South

## 2017-05-03 NOTE — SOCIAL WORKER TX PLAN PSYCH
Treatment Plan
- Please Document:
- Evidence that there is ongoing collaboration between
- the patient and the interdisciplinary team,
- including the patient's active participation and
- responsibility for engaging in the treatment regimen,
- and that the treatment plan is individualized and
- relevant to the patient's conditions.
- Treatment plan should reflect documentation indicating
- that all active therapeutic efforts are included.
Strengths/Capabilities:
maintained sobriety past 4 yrs/engaged in tx at [facility]
Physical Limitations (Interventions):
None reported
Patient Identified Trmt Goals:
I want to go home. 
Discharge Plan:
[facility] Care Case management
 
Problem/Goals #1
Problem #1: psychosis
Goal (Short Term):
Be safe on unit, inform staff if having AH/VH.  Reduce or eliminate psychoisis, 
attend groups if tolerated.  Meet with SW daily and MD daily as well. Be 
compliant with medications.n Family meeting offered (PT refused). 
Goal (Long Term):
Maintain stabel mood, and discuss stressors that led to hospitalization and 
reduce/prevent them.  PT. to engage in [facility] level of care and remain compliant on
medications.
Interventions:
Provide 1:1  individual therapy, PT. to attend 3-4 groups daily (if tolerated)
.  PT to meet with MD daily.  Encourage strengths, supports and provide 
validation and ways to maintain compliance with treatment and medication.
Modalities:
Provide CBT, MI, DBT treatment modalities.  Individual, group and medication 
management, accupuncture, relaxation/meditation, discuss triggers and coping 
skills to stressors.
 
DSM5/PS Stressors/Medical Prob
Diagnosis' (DSM 5, Stressors, Medical):
F32.9 Depressive Disorder Unspecified. F31.81
 Bipolar Disorder II & F43.10 PTSD by hx (recent
 12/16), Medical Condition Hyperthyroidism, HTN,
 Hyperlipidemia, Hx of Migraines.  Unemployed.
Current GAF: 20
 
Treatment Team
- Responsibilities of members of the treatment team include:
- Medication Management- MD or APRN
- Medication Administration and Monitoring- Nurse
- Group Therapy- Occupational Therapist
- 1:1 Therapy,Disch Planning,family involvement-

## 2017-05-03 NOTE — SOCIAL WORKER PROG NOTE PSYCH
Social Work Progress Note
Progress Note
Discused Nicolle in team meeting with staff and Dr. Howell. Met with Nicolle 
this afternoon, she denied SI/HI, she stated how "awful" her experience was 
prior to coming into [facility].  She stated she police were called on her, and she
said, "It could have been done differently."  Nicolle stated she wanted to go 
home and that she doesn't feel she needs to be here (on CPS).  She said she 
wanted additional locks on her door "for safety reasons, so when my 22yo son 
stays with me then he will be safe."  Nicolle stated she slept ok last night, 
her appetite is good. She denied SI/HI, she seemed pleasant and cooperative.  No
mention of anyone breaking into her home or fears.  Informed Nicolle another 
 will be meeiting with her tomorrow, 5/4, but Dr. Howell will see
her today.  Asked if there was anyone she would like a family meeting with and 
she declined, stated "I don't have anyone." Nicolle is interested in attending 
[employer] [employer] [facility] [employer]. When informed Nicolle her , Leslie wanted to visit with her 
today on the unit, Nicolle said "No!!."  Plan to discuss if her 22yo son may 
want to come in for a meeting? 
 
Spoke with Luba Campbell, [facility] this morning, she stated staff was 
concerned about Nicolle's increased paranoia, and how they found bottles of her 
medications and concern she wasn't taking her medication. They feel she has been
increasingly paranoid over the past month.  She has a , Leslie - who 
called today and wanted to visit with Nicolle on the unit. I called her back and
advised she wait for a few days prior to meeting with Nicolle.

## 2017-05-03 NOTE — NUR
PT IS VISIBLE ON UNIT, WATCHING TV IN LOUNGE AND SOCIALZING WITH PEERS.
COOPERATIVE AND COMPLIANT WITH STAFF. ATTENDED WRAP UP MEETING. NO COMPLAINTS
OR SI REPORTED. PT HAS A STABLE MOOD AND FULL RANGE AFFECT.

## 2017-05-03 NOTE — NUR
PT IS PLEASANT, RESPECTFUL, CALM, PRESENT WITHIN THE COMMUNITY AND INTERACTING
APPROPRIATELY WITH PEERS AND STAFF, REPORTED IN PLANNING MEETING + MOOD AND +
SLEEP AND GOAL WAS TO "GO TO GROUPS AND BE POSITIVE", VSS, MOOD STABLE WITH
FLAT AFFECT YET DOES HAVE AN APPROPRIATE FULL RANGE AFFECT/RESPONSE WHEN
ENGAGED IN CONVO.

## 2017-05-04 VITALS — DIASTOLIC BLOOD PRESSURE: 77 MMHG | SYSTOLIC BLOOD PRESSURE: 130 MMHG

## 2017-05-04 VITALS — SYSTOLIC BLOOD PRESSURE: 120 MMHG | DIASTOLIC BLOOD PRESSURE: 83 MMHG

## 2017-05-04 VITALS — DIASTOLIC BLOOD PRESSURE: 91 MMHG | SYSTOLIC BLOOD PRESSURE: 132 MMHG

## 2017-05-04 VITALS — DIASTOLIC BLOOD PRESSURE: 96 MMHG | SYSTOLIC BLOOD PRESSURE: 126 MMHG

## 2017-05-04 NOTE — SOCIAL WORKER PROG NOTE PSYCH
Social Work Progress Note
Progress Note
Pt reports feeling sad, depressed and hopeless, didnt want to get out of bed. Pt
reports she isnt feeling physically well either and prefers to rest at this 
time. 
 
* Additions to or changes in the treatment plan along with reasons for same
* Contacts with family and significant others in treatment, including family 
meeting(s)
* Family attitudes
* Community resource contacts and liaison with other clinicians/agencies

## 2017-05-04 NOTE — CP SOUTH PROGRESS NOTE PSYCH
Psych (Inpt) Progress Note
Progress Note
Include the following elements, when applicable:
Involvement in the active treatment of the patient with behavioral observations 
of the patient and the patient's response to the treatment.
Review of the ongoing treatment process in the context of the treatment plan.
Indication of how multi-disciplinary staff members are carrying out the 
treatment plan.
Plans for future interventions and recommendations for revision of the treatment
plan.
Liaison with other physicians/providers.
Progress Note:
 
PSYCHIATRIST NOTE, 5/4/2017:
 
     I discussed this patient's progress thus far, current mental status, 
treatment and discharge planning with staff team today in the daly morning ITTM 
and also met with patient again myself in individual session.  Patient reported 
sleeping well last night and her impression was confirmed in the nursing 
overnight report.  She appeared less apprehensive, brighter in affect, able to 
smile on occasion, better groomed and more hopeful/future-oriented.  No PRN 
Seroquel has been given/required thus far; patient is currently taking a total 
of Seroquel, 225mg daily; there is no evident paranoid ideation or psychotic 
thought process, no negative effects to date on mood from reduction in 
maintenance dose of Zoloft from 200mg to 150mg/day.  If a family meeting cannot 
be organized, perhaps there could at least be a meeting with patient and her 
, Leslie, while she is here.

## 2017-05-04 NOTE — NUR
PT IS CALM, COOPERATIVE WITH STAFF AND PEERS, AND COMPLIANT WITH UNIT RULES.
PT IS OFTEN IN [place], INTERACTING WELL WITH OTHERS. MOOD IS STABLE, AFFECT IS
EUTHYMIC TO FULL RANGE, COMMUNICATION IS ORGANIZED AND APPEARS NORMAL IN ALL
RESPECTS, AND APPETITE IS NORMAL. PT DENIES SI AT THIS TIME.

## 2017-05-04 NOTE — NUR
PT IS PRESENT WITHIN THE COMMUNITY AND SOCIAL AT TIMES WITH PEERS AND STAFF,
DID NOT GO TO PLANNING GROUP. THIS MORNING AT 0800 MED PASS, PT REPORTED TO
MED RN THAT SHE WAS EXPERIENCING ACID REFLUX/ANXIETY THAT WAS MANIFESTED IN
CHEST PAIN, NO ACUTE DISTRESS NOTED OR DIAPHORESIS, SOB, ETC...VITAL SIGNS
STABLE. DR. MOBLEY NOTIFIED RE: THE ABOVE AND EKG ORDERED AND TROPONINS. PT
RESTED AND HAD + APPETITE AND REPORTED S/SX WERE DECREASING, EKG REMAINED
UNCHANGED FROM PREVIOUS AND TROPONINS WERE NEGATIVE, TEAM AWARE. PT STABLE
OVERALL WITH NO OTHER COMPLAINTS OR ISSUES.

## 2017-05-05 VITALS — SYSTOLIC BLOOD PRESSURE: 122 MMHG | DIASTOLIC BLOOD PRESSURE: 87 MMHG

## 2017-05-05 VITALS — SYSTOLIC BLOOD PRESSURE: 122 MMHG | DIASTOLIC BLOOD PRESSURE: 72 MMHG

## 2017-05-05 VITALS — DIASTOLIC BLOOD PRESSURE: 87 MMHG | SYSTOLIC BLOOD PRESSURE: 122 MMHG

## 2017-05-05 VITALS — SYSTOLIC BLOOD PRESSURE: 115 MMHG | DIASTOLIC BLOOD PRESSURE: 79 MMHG

## 2017-05-05 VITALS — SYSTOLIC BLOOD PRESSURE: 135 MMHG | DIASTOLIC BLOOD PRESSURE: 58 MMHG

## 2017-05-05 LAB — LITHIUM SERPL-SCNC: 0.7 MMOL/L (ref 0.6–1.2)

## 2017-05-05 NOTE — SOCIAL WORKER PROG NOTE PSYCH
Social Work Progress Note
Progress Note
Discussed Nicolle in team meeting [facility] Dr. Howell and staff. Met with her this
afternoon, Nicolle stated she doesn't know why she is here. She is angry with 
her , Leslie for "coming at me....she was yelling at me about losing 
my apartment last time."  Nicolle stated she wanted the extra lock on her door, 
but called the housing authority and they told her she could not do this. 
Apparently she informed Leslie of this as well. When I asked Nicolle about her 
thoughts about someone coming through the ceiling, she stated her neighbor 
upstairs was banging on the floor, and it was upsetting to her. She didn't know 
why her neighbor was doing this and doesn't know him well enough to talk to him.
 She denied SI/HI, no psychosis today. No overt psychosis noted. She did seem 
irritable, agitated when discussing  her experience with [facility] and her case 
manager. She does not want a meeting with her 25yo son, or anyone else.  She 
stated "He (son) has enough going on, I don't want to involve him."  Nicolle 
stated she has her 25yo son over sometimes, but that he doesn't live at her 
apartment. 
 
Discussed signing a voluntary today, Nicolle refused. She wants to leave on 
Monday and go to [facility].  Encouraged Nicolle to discuss with Dr. Howell when 
she meets with him.
 
Need to do Transfer form for IOP referral.

## 2017-05-05 NOTE — NUR
PT HAS BEEN SEEN IN THE LG AND WITH PEERS AROUND THE [facility]. SHE HAS NOT HAD
MANY CONVERSATIONS WITH PEERS BUT WILL LAUGH AND SMILE AROUND A FEW PEOPLE.
SHE APPEARS TO BE SLIGHTY CONSTRICTED AT TIMES BUT IS NOT ISOLATIVE.
PT
HAS NOT EXPRESSED ANY EMOTIONAL DISTRESS OR THOUGHTS OF SI.

## 2017-05-05 NOTE — NUR
PT PRESENT IN COMMUNITY, ATTENDING GROUPS, INTERACTING WITH PEERS AND STAFF.
PT MOOD STABLE - FULL RANGE AFFECT.  PT ATE WELL.  VS GOOD.  NO COMPLAINTS
OFFERED.

## 2017-05-05 NOTE — CP SOUTH PROGRESS NOTE PSYCH
Psych (Inpt) Progress Note
Progress Note
Include the following elements, when applicable:
Involvement in the active treatment of the patient with behavioral observations 
of the patient and the patient's response to the treatment.
Review of the ongoing treatment process in the context of the treatment plan.
Indication of how multi-disciplinary staff members are carrying out the 
treatment plan.
Plans for future interventions and recommendations for revision of the treatment
plan.
Liaison with other physicians/providers.
Progress Note:
 
PSYCHIATRIST NOTE, 5/5/2017:
 
     I discussed this patient's progress to date, current mental status, 
treatment and discharge planning with staff team today in the daily morning ITTM
and also met with her again myself in individual session.  Patient slept well 
again last night and today her affect seemed brighter and mood improving.  I had
thought of augmenting Zoloft at the lower dose of 150mg/day with Wellbutrin, but
patient told me she had experienced considerable jitteriness upon previous 
exposure to that medication; in any event, augmentation seems less indicated/
less of a necessity at this point.  Serum Lithiuim level this morning was 0.7mEq
/L and Lithium continues to be well tolerated at current dose of 600mg/day; 
since patient is doing better in terms of mood, though current concentration of 
Lithium is somewhat on the low side in the interests of preserving her tolerance
of this important medication for as long as possible, we will not increase 
current dosing but will repeat level and renal functions once more prior to 
discharge; creatinine/GFR have actually improved somewhat since admission, 
though BUN nearly doubled from 11 to 20; I have reminded patient to maintain an 
adequate fuild intake.

## 2017-05-06 VITALS — SYSTOLIC BLOOD PRESSURE: 102 MMHG | DIASTOLIC BLOOD PRESSURE: 80 MMHG

## 2017-05-06 VITALS — SYSTOLIC BLOOD PRESSURE: 116 MMHG | DIASTOLIC BLOOD PRESSURE: 75 MMHG

## 2017-05-06 VITALS — SYSTOLIC BLOOD PRESSURE: 104 MMHG | DIASTOLIC BLOOD PRESSURE: 63 MMHG

## 2017-05-06 VITALS — SYSTOLIC BLOOD PRESSURE: 122 MMHG | DIASTOLIC BLOOD PRESSURE: 94 MMHG

## 2017-05-06 NOTE — CP SOUTH PROGRESS NOTE PSYCH
Psych (Inpt) Progress Note
Progress Note
Include the following elements, when applicable:
Involvement in the active treatment of the patient with behavioral observations 
of the patient and the patient's response to the treatment.
Review of the ongoing treatment process in the context of the treatment plan.
Indication of how multi-disciplinary staff members are carrying out the 
treatment plan.
Plans for future interventions and recommendations for revision of the treatment
plan.
Liaison with other physicians/providers.
 
Progress Note:
Pt notes that he is "good." Noted by RNs to be attending groups. Pt notes she 
slept well. Has no visitors and limited supports. Denies SI or HI. Denies AVHs.
 
 Current Medications
 
 
  Sig/Jerel Start time  Last
 
Medication Dose Route Stop Time Status Admin
 
Al Hydroxide/Mg  30 ML Q12P PRN 05/04 1345 AC 05/04
 
Hydroxide  PO   1659
 
Cholecalciferol 1,000 IU 0800 05/04 0800 AC 05/06
 
  PO   0821
 
Cyanocobalamin 1,000 MCG 0800 05/05 0800 AC 05/06
 
  PO   0821
 
Gabapentin 300 MG 0800,1700,2200 05/03 2200 AC 05/06
 
  PO   0820
 
Lithium Carbonate 300 MG 0800,2000 05/03 2000 AC 05/06
 
  PO   0820
 
Losartan Potassium 50 MG 0800 05/04 1345 AC 05/06
 
  PO   0820
 
Methimazole 10 MG 0800 05/04 0800 AC 05/06
 
  PO   0821
 
Multivitamins  1 TAB DAILY 05/02 1000 AC 05/06
 
Therapeutic  PO   0821
 
Omeprazole 40 MG DAILY AC 05/03 0700 AC 05/06
 
  PO   0630
 
Propranolol HCl 20 MG 0800,2000 05/03 2000 AC 05/06
 
  PO   0820
 
Quetiapine Fumarate 25 MG 0800,1300,1700 05/04 0800 AC 05/06
 
  PO   1227
 
Quetiapine Fumarate 150 MG 2200 05/03 2200 AC 05/05
 
  PO   2124
 
Quetiapine Fumarate 25 MG Q2P PRN 05/03 1800 AC 05/05
 
  PO   1101
 
Sertraline HCl 150 MG 0800 05/04 0800 AC 05/06
 
  PO   0821
 
Topiramate 50 MG 0800,1700,2200 05/03 2200 AC 05/06
 
  PO   0821
 
 
 Laboratory Tests
 
 
 05/05 05/04
 
 0639 0805
 
Chemistry  
 
  BUN (7 - 17 mg/dL) 20  H 
 
  Creatinine (0.5 - 1.0 mg/dL) 1.0 
 
  Estimated GFR (>60 ml/min) 58  L 
 
  BUN/Creatinine Ratio (7 - 25 %) 20.0 
 
  Troponin I (< 0.11 ng/ml)  < 0.01
 
  TSH (0.270 - 4.200 uIU/mL) 6.190  H 
 
  Free T4 (0.64 - 1.79 ng/dL) 0.45  L 
 
Toxicology  
 
  Lithium (0.6 - 1.2 mmol/L) 0.7 
 
 
Vital Signs
 
 
Date Time Temp Pulse Resp B/P B/P Pulse O2 O2 Flow FiO2
 
     Mean Ox Delivery Rate 
 
05/06 1204  80  104/63     
 
05/06 0820 96.1 86 16 102/80     
 
05/06 0820 96.1 86 16 102/80     
 
05/06 0746 96.1 86  102/80     
 
05/05 2000 96.8 96  122/87     
 
05/05 1934  96  122/87     
 
05/05 1934  96  122/87 05/05 1544  93  122/72     
 
 
MSE 
Appears as stated age. Cooperative behavior, good, appropriate eye contact. Nl 
speech rate and prosody. No psychomotor retardation or agitation. Mood fine 
Affect flat, depressed, constricted, appropriate, non-liable. Linear and goal 
directed thought process. Denies SI or HI. Does not appear to be responding to 
internal stimuli. Denies AVHs, paranoia, or delusions. I/J: limited
 
A/P: Pt with bipolar disorder, most recent episode depressed now with improved 
mood.
 
- Continue current medication regimen
- Plans to f/u endocrine with Dr. Vizcaino (outpt endo)

## 2017-05-06 NOTE — NUR
PT IS VISIBLE ON UNIT, MOSTLY STAYING TO HERSELF AND WATCHING TV IN LOUNGE.
MINIMAL INTERACTION WITH PEERS, COOPERATIVE AND COMPLIANT WITH STAFF. NO
COMPLAINTS OR SI REPORTED. PT HAS A STABLE MOOD AND FULL RANGE AFFECT.

## 2017-05-06 NOTE — NUR
Patient is present in the community, A&O X 3, compliant with medication and
group theraies. Patient reported poor night sleep but good appetite a bit
reclusive in her room on bed rest, Appetite is good, mood is stable with
Euthymic affect, minimal interaction with other peers and staff members.
Patient denies any thought of self-harm and to someone else.

## 2017-05-07 VITALS — DIASTOLIC BLOOD PRESSURE: 77 MMHG | SYSTOLIC BLOOD PRESSURE: 118 MMHG

## 2017-05-07 VITALS — DIASTOLIC BLOOD PRESSURE: 81 MMHG | SYSTOLIC BLOOD PRESSURE: 123 MMHG

## 2017-05-07 VITALS — DIASTOLIC BLOOD PRESSURE: 67 MMHG | SYSTOLIC BLOOD PRESSURE: 120 MMHG

## 2017-05-07 VITALS — SYSTOLIC BLOOD PRESSURE: 123 MMHG | DIASTOLIC BLOOD PRESSURE: 78 MMHG

## 2017-05-07 NOTE — NUR
PT IS CALM, COOPERATIVE WITH STAFF AND PEERS, AND COMPLIANT WITH UNIT RULES.
PT IS OFTEN IN [place], INTERCATING WELL WITH OTHERS. MOOD IS STABLE, AFFECT IS
EUTHYMIC TO FULL RNAGE, COMMUNICATION IS ORGANZIED AND APPEARS NORMAL IN ALL
RESPECTS, AND APPETITE IS NORMAL. PT DENIES SIS AT THIS TIME.

## 2017-05-07 NOTE — CP SOUTH PROGRESS NOTE PSYCH
Psych (Inpt) Progress Note
Progress Note
Include the following elements, when applicable:
Involvement in the active treatment of the patient with behavioral observations 
of the patient and the patient's response to the treatment.
Review of the ongoing treatment process in the context of the treatment plan.
Indication of how multi-disciplinary staff members are carrying out the 
treatment plan.
Plans for future interventions and recommendations for revision of the treatment
plan.
Liaison with other physicians/providers.
 
Pt notes that she is "good." Notes good sleep as well. Plans for d/c monday. 
Still not contacted mother as does not want to worry her. Denies SI or HI. 
Denies AVHs. 
 
 Current Medications
 
 
  Sig/Jerel Start time  Last
 
Medication Dose Route Stop Time Status Admin
 
Al Hydroxide/Mg  30 ML Q12P PRN 05/04 1345 AC 05/04
 
Hydroxide  PO   1659
 
Cholecalciferol 1,000 IU 0800 05/04 0800 AC 05/07
 
  PO   0826
 
Cyanocobalamin 1,000 MCG 0800 05/05 0800 AC 05/07
 
  PO   0826
 
Gabapentin 300 MG 0800,1700,2200 05/03 2200 AC 05/07
 
  PO   0826
 
Lithium Carbonate 300 MG 0800,2000 05/03 2000 AC 05/07
 
  PO   0826
 
Losartan Potassium 50 MG 0800 05/04 1345 AC 05/07
 
  PO   0826
 
Methimazole 10 MG 0800 05/04 0800 AC 05/07
 
  PO   0825
 
Multivitamins  1 TAB DAILY 05/02 1000 AC 05/07
 
Therapeutic  PO   0826
 
Omeprazole 40 MG DAILY AC 05/03 0700 AC 05/07
 
  PO   0604
 
Propranolol HCl 20 MG 0800,2000 05/03 2000 AC 05/07
 
  PO   0825
 
Quetiapine Fumarate 25 MG 0800,1300,1700 05/04 0800 AC 05/07
 
  PO   0826
 
Quetiapine Fumarate 150 MG 2200 05/03 2200 AC 05/06
 
  PO   2126
 
Quetiapine Fumarate 25 MG Q2P PRN 05/03 1800 AC 05/06
 
  PO   1334
 
Sertraline HCl 150 MG 0800 05/04 0800 AC 05/07
 
  PO   0826
 
Topiramate 50 MG 0800,1700,2200 05/03 2200 AC 05/07
 
  PO   0826
 
 
 Laboratory Tests
 
 
 05/05
 
 0639
 
Chemistry 
 
  BUN (7 - 17 mg/dL) 20  H
 
  Creatinine (0.5 - 1.0 mg/dL) 1.0
 
  Estimated GFR (>60 ml/min) 58  L
 
  BUN/Creatinine Ratio (7 - 25 %) 20.0
 
  TSH (0.270 - 4.200 uIU/mL) 6.190  H
 
  Free T4 (0.64 - 1.79 ng/dL) 0.45  L
 
Toxicology 
 
  Lithium (0.6 - 1.2 mmol/L) 0.7
 
 
Vital Signs
 
 
Date Time Temp Pulse Resp B/P B/P Pulse O2 O2 Flow FiO2
 
     Mean Ox Delivery Rate 
 
05/07 0826 96.1 79 16 118/77     
 
05/07 0825 96.1 79 16 118/77 05/07 0740 96.1 79  118/77 05/06 1950 96.6 95  122/94     
 
05/06 1943 96.1 89 16 116/75     
 
05/06 1613  89  116/75     
 
05/06 1204  80  104/63     
 
 
 
MSE 
Appears as stated age. Cooperative behavior, good, appropriate eye contact. Nl 
speech rate and prosody. No psychomotor retardation or agitation. Mood good 
Affect flat, depressed, constricted, appropriate, non-liable. Linear and goal 
directed thought process. Denies SI or HI. Does not appear to be responding to 
internal stimuli. Denies AVHs, paranoia, or delusions. I/J: limited
 
A/P: Pt with bipolar disorder, most recent episode depressed now with improved 
mood.
 
- Continue current medication regimen
- Plans to f/u endocrine with Dr. Vizcaino (outpt endo)

## 2017-05-07 NOTE — NUR
Patient presents as irritable, flat, minimal interaction with peers. Patient
attentive to hygiene, med compliant, reports d/c tomorrow.

## 2017-05-08 VITALS — DIASTOLIC BLOOD PRESSURE: 68 MMHG | SYSTOLIC BLOOD PRESSURE: 108 MMHG

## 2017-05-08 VITALS — DIASTOLIC BLOOD PRESSURE: 79 MMHG | SYSTOLIC BLOOD PRESSURE: 121 MMHG

## 2017-05-08 LAB — LITHIUM SERPL-SCNC: 0.7 MMOL/L (ref 0.6–1.2)

## 2017-05-08 NOTE — SOCIAL WORKER PROG NOTE PSYCH
Social Work Progress Note
Progress Note
Met with Nicolle today, she was anxious to discharge today.  She was irritable 
the process wasn't fast enough for her. She agreed to follow-up at  [facility] has an
intake on Tues. 5/9/17 at 12:45pm.  She was given a smoking cessation referral 
for 5/17/17 at 4pm with LATANYA FerrerW at   [address]  Nicolle 
seemed interested in this as well. Plan to Faxed W10 to [facility] and [facility]. 
Completed [facility] online discharge. Phoned Luba Campbell, of [facility] (Waylon Styles), and informed her of Nicolle's discharge. Nicolle has a  at 
[facility] - [place].

## 2017-05-08 NOTE — NUR
PT IS SCHEDULED FOR D/C TODAY TO [facility]. SHE REPORTS THAT SHE FEELS READY TO
LEAVE. SHE DENIED ANY THOUGHTS OF SUICIDE OR SELF HARM AT THIS TIME. SHE IS
COMPLIANT WITH HER MED REGIME AND WILL FOLLOW UP WITH  CARE. PT IS GIVEN
EDUCATION ON MANAGING HER MOOD D/O AND OM PREVENTING SUICIDE

## 2017-05-08 NOTE — NUR
Patient renains on q15 minutes checks for safety and observation.Presents with
a constricted affect and anxious mood but pleasant, calm and cooperative.
Visible on the unit and social with select peers.Compliant with scheduled
medications and denies any side effects. Patient received PRN seroquel due to
increased agitation with some effect, patient upset about delayed discharge.
Denies Si/HI/AVH at this time. Will continue to monitor and offer support as
needed.

## 2017-05-08 NOTE — CP SOUTH PROGRESS NOTE PSYCH
Psych (Inpt) Progress Note
Progress Note
Include the following elements, when applicable:
Involvement in the active treatment of the patient with behavioral observations 
of the patient and the patient's response to the treatment.
Review of the ongoing treatment process in the context of the treatment plan.
Indication of how multi-disciplinary staff members are carrying out the 
treatment plan.
Plans for future interventions and recommendations for revision of the treatment
plan.
Liaison with other physicians/providers.
Progress Note:
 
PSYCHIATRIST NOTE (DISCHARGE), 5/8/2017:
 
     I discussed this patient's progress to date, current mental status, 
treatment and discharge plans with staff team today in the daily morning ITTM 
and met with her again myself in individual session prior to discharging her to 
intake at the [facility] tomorrow, 5/9/2017 at 12:45pm; she continues to be 
very positive re attending [facility]; she was concerned she would not be able to 
attend program until 5/11/2017, as she is currently scheduled for removal of a 
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
2017.  Export dose was stabilized at 600mg/day with serum concentrations stable
at 0.7mEq/L; though level could be considered somewhat marginal patient is 
currently stabilized on this regimen, and we are trying to preserve her ability 
to remain on this very helpful medication as long as she can and renal and 
thyroid functions should be followed closely while she continues on Lithium 
prophylaxis.  Apparently, patient was not on any neuroleptic medication upon 
most recent discharge from [facility] in 12/2016; addition and titration of 
Seroquel seems to have been very helpful during current admission, but dose can 
likely be gradually reduced following an interval of outpatient stabilization.  
Patient is on day of discharge, bright and cheerful in affect, euthymic, not 
irritable, future-oriented, showing no current evidence of suicidal or homicidal
ideation, plans, intent or impulses and is well aware of her safety plan should 
she every in future come to believe herself at acute risk of self-harm or 
harming others.
Among the issues which may have contributed to this readmission are move to a 
new apartment, decrease in Lithium dose, and not being on a neuroleptic agent.
 
I called into [facility], [place], [place] on day of discharge, 5
/8/2017:
 
Lithium carbonate, 300mg:  i tablet 2x/day  (600mg daily); #28 with no refill  (
stabilize mood)
Seroquel, 50mg:  i 3x/day; #42 with no refill  (nieves mood/clarify thinking/
reduce racing thoughts)
Seroquel, 100mg:  i tablet nightly at HS; #14 with no refill (as above)
(patient is currently prescribed 250mg/day of Seroquel)
 
I decided to discontinue the regular Neurontin due to concern that along with 
the introduction and upward titration of Seroquel the combination might prove 
sedating and possibly cause patient to stop taking the Seroquel which has been 
very beneficial during this admission, whereas, I cannot say that the Neurotin 
has added significantly to restabilization.
 
 
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
 
 
patient also told me she has sufficient supply at home to continue taking:
 
Topamax, 50mg 3x/day  (migraine prophylaxis)
Cozaar, 50mg/day  (for hypertension)
Prilosec, 40mg/day  (for acid reflux)
Inderal, 20mg 2x/day  (
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
## 2017-07-12 ENCOUNTER — HOSPITAL ENCOUNTER (EMERGENCY)
Dept: HOSPITAL 68 - ERH | Age: 57
End: 2017-07-12
Payer: COMMERCIAL

## 2017-07-12 VITALS — BODY MASS INDEX: 23.49 KG/M2 | WEIGHT: 155 LBS | HEIGHT: 68 IN

## 2017-07-12 VITALS — DIASTOLIC BLOOD PRESSURE: 88 MMHG | SYSTOLIC BLOOD PRESSURE: 129 MMHG

## 2017-07-12 DIAGNOSIS — K05.10: Primary | ICD-10-CM

## 2017-07-12 DIAGNOSIS — R22.0: ICD-10-CM

## 2017-07-12 NOTE — ED THROAT/DENTAL COMPLAINT
History of Present Illness
 
General
Chief Complaint: Sore Throat, Dental Pain
Stated Complaint: PER PT R SIDE MOUTH/TOOTHINFECTION
Source: patient, old records
Exam Limitations: no limitations
 
Vital Signs & Intake/Output
Vital Signs & Intake/Output
 Vital Signs
 
 
Date Time Temp Pulse Resp B/P B/P Pulse O2 O2 Flow FiO2
 
     Mean Ox Delivery Rate 
 
07/12 1154 98.8 88 18 129/88  98 Room Air  
 
 
Allergies
Coded Allergies:
buspirone (From BUSPAR) (Intermediate, PER PT UNKNOWN STATES DOES NOT EXPERIENCE
DYSPNEA 12/08/16)
bupropion (From WELLBUTRIN) (Mild, JITTERY 05/05/17)
  AS PER . -CG 5/5/17 1613
 
Reconcile Medications
Amoxicillin 875 MG TABLET   1 TAB PO BID gingivitis
Cholecalciferol (Vitamin D3) (Vitamin D) 2,000 UNIT CAPSULE   1 CAP PO DAILY 
SUPPLEMENT  (Reported)
Lithium Carbonate 300 MG CAPSULE   300 MG PO 0800,2000 mood stabilization
Losartan Potassium 50 MG TABLET   1 TAB PO DAILY lower blood pressure  (Reported
)
Methimazole 10 MG TABLET   1 TAB PO DAILY THYROID  (Reported)
Multiple Vitamin (Multivitamins) 1 EACH TABLET   1 TAB PO DAILY SUPPLEMENT  (
Reported)
Pantoprazole Sodium 40 MG TABLET.DR   1 TAB PO DAILY GI  (Reported)
Propranolol HCl 20 MG TABLET   1 TAB PO BID THYROID  (Reported)
Quetiapine Fumarate (Seroquel) 50 MG TABLET   150 MG PO 2200 clarify thinking
Quetiapine Fumarate (Seroquel) 50 MG TABLET   50 MG PO BID clarify thinking
Sertraline HCl (Zoloft) 100 MG TABLET   100 MG PO 0800 anti-depressant
Topiramate 50 MG TABLET   1 TAB PO TID MIGRAINES  (Reported)
 
Triage Note:
55 YO FEMALE TO ER C/O ?INFECTION TO GUM. STATES
SHE HAD A DEEP CLEANING DONE 1 WEEK AGO AND "THEY
DIDNT DO WHAT THEY WERE SUPPOSED TO" NOTED WITH
SWELLING TO R SIDE OF MOUTH. AFEBRILE AT THIS TIME
Triage Nurses Notes Reviewed? yes
Onset: Gradual
Duration: day(s):
Timing: recent history
Severity: moderate
HPI:
55yo female presents to ED c/o pain in teeth and gums following dental cleaning 
procedure one week ago with swelling to right side of her cheek since yesterday.
 She also complains of gum bleeding yesterday while brushing her teeth.  She 
also c/o left ear pain and chills beginning yesterday. She had some abdominal 
pain yesterday that she noticed bilaterally after she was bending over to pick 
up the laundry.  She denies fevers, chills, sore throat, difficulty breathing, 
nausea, vomiting, diarrhea.
(BELINDA TAVERA PA-C)
 
Past History
 
Travel History
Traveled to [place] past 21 day No
 
Medical History
Any Pertinent Medical History? see below for history
Neurological: migraine
EENT: NONE
Cardiovascular: hypertension, hyperlipidemia
Respiratory: NONE
Gastrointestinal: GERD
Hepatic: NONE
Renal: RENAL STONES W/ STENTS
Musculoskeletal: NONE
Psychiatric: bipolar disease, substance abuse (use of cocaine, free-basing), 
PTSD
Endocrine: hyperthyroidism
Blood Disorders: NONE
Cancer(s): basal cell carcinoma (lesion R temple( in [place])), skin cancer
GYN/Reproductive: PID
History of MRSA: No
History of VRE: No
History of CDIFF: No
 
Surgical History
Surgical History: hysterectomy, N
 
Psychosocial History
Who do you live with Patient/Self
Services at Home None
What is your primary language English
Tobacco Use: Current Daily Use
Daily Tobacco Use Amount/Type: => 5 Cigarettes daily
 
Family History
Family History, If Any:
MOTHER
  FH: hypertension
  FHx: hyperthyroidism
FATHER
Relation not specified for:
  Kidney stones
 
Hx Contributory? No
(BELINDA TAVERA PA-C)
 
Review of Systems
 
Review of Systems
Constitutional:
Reports: see HPI. 
EENTM:
Reports: see HPI. 
Respiratory:
Reports: no symptoms. 
Cardiovascular:
Reports: no symptoms. 
GI:
Reports: see HPI. 
Genitourinary:
Reports: no symptoms. 
Musculoskeletal:
Reports: no symptoms. 
Skin:
Reports: no symptoms. 
Neurological/Psychological:
Reports: no symptoms. 
Hematologic/Endocrine:
Reports: no symptoms. 
Immunologic/Allergic:
Reports: no symptoms. 
All Other Systems: Reviewed and Negative
(BELINDA TAVERA PA-C)
 
Physical Exam
 
Physical Exam
General Appearance: well developed/nourished, no apparent distress, alert, awake
Head: atraumatic, normal appearance
Eyes:
Bilateral: normal appearance, EOMI. 
Ears:
Bilateral: canal normal, Tympanic normal. 
Nose: normal inspection
Mouth/Throat: pharynx normal, gingival tenderness, firm swelling of right upper 
gingiva without fluctuance, mild erythema
Neck: normal inspection, supple, full range of motion
Cardiovascular/Respiratory: normal breath sounds, regular rate/rhythm, no 
respiratory distress
Gastrointestinal: normal bowel sounds
Back: normal inspection, normal range of motion
Neurologic/Psych: awake, alert, oriented x 3
Skin: intact, normal color
 
Core Measures
ACS in differential dx? No
Severe Sepsis Present: No
Septic Shock Present: No
(AYSE RAMIREZ,BELINDA TRINIDAD)
 
Progress
Differential Diagnosis: carious tooth, Ludwigs angina, odontogenic abscess, bianca
-tonsillar abscess, stomatitis/gingivitis, strep pharyngitis
Plan of Care:
The patient has mild right sided swelling of her cheek.  She also has a tender 
lump on gingiva of right upper side of mouth.  The patient agrees to follow-up 
with the dentist to perform her teeth cleaning today or tomorrow.  She is 
nontoxic appearing, afebrile, her vital signs are stable, she is sitting 
comfortably in stretcher in no acute distress.  She was given a course of 
antibiotics for possible infection.  She was given an additional dental referral
for second consultation if necessary.  She was discussed with Dr. Hays.  The 
patient is in agreement with the plan of care.
(BELINDA TAVERA PA-C)
 
Departure
 
Departure
Disposition: HOME OR SELF CARE
Condition: Stable
Clinical Impression
Primary Impression: Pain, dental
Secondary Impressions: Cheek swelling, Gingivitis
Referrals:
LERMAN DMD,JESUS KLELOGG MD,MYL (PCP/Family)
 
Additional Instructions:
Take full course of antibiotics as prescribed.  Follow-up with your dentist who 
performed the tooth procedure, call to inform them that you're seen and 
evaluated today.  You may also call dental referral given to today for 
additional consultation.  Take Tylenol or Motrin as prescribed as needed for ear
pain.  Follow up with her primary care doctor.  Return with any worsening 
symptoms or concerns.
Departure Forms:
Customer Survey
General Discharge Information
Prescriptions:
Current Visit Scripts
Amoxicillin 1 TAB PO BID  
     #14 TAB 
 
 
(BELINDA TAVERA PA-C)
 
PA/NP Co-Sign Statement
Statement:
ED Attending supervision documentation-
 
 I saw and evaluated the patient. I have also reviewed all the pertinent lab 
results and diagnostic results. I agree with the findings and the plan of care 
as documented in the PA's/NP's documentation. 
 
x I have reviewed the ED Record and agree with the PA's/NP's documentation.
 
[] Additions or exceptions (if any) to the PAs/NP's note and plan are 
summarized below:
[]
 
(SRIRAM FERNANDEZ,LUIS)

## 2018-05-26 ENCOUNTER — HOSPITAL ENCOUNTER (EMERGENCY)
Dept: HOSPITAL 68 - ERH | Age: 58
End: 2018-05-26
Payer: COMMERCIAL

## 2018-05-26 VITALS — HEIGHT: 68 IN | WEIGHT: 160 LBS | BODY MASS INDEX: 24.25 KG/M2

## 2018-05-26 VITALS — DIASTOLIC BLOOD PRESSURE: 78 MMHG | SYSTOLIC BLOOD PRESSURE: 124 MMHG

## 2018-05-26 DIAGNOSIS — N20.0: Primary | ICD-10-CM

## 2018-05-26 LAB
ABSOLUTE GRANULOCYTE CT: 4.1 /CUMM (ref 1.4–6.5)
BASOPHILS # BLD: 0 /CUMM (ref 0–0.2)
BASOPHILS NFR BLD: 0.6 % (ref 0–2)
EOSINOPHIL # BLD: 0.2 /CUMM (ref 0–0.7)
EOSINOPHIL NFR BLD: 2.7 % (ref 0–5)
ERYTHROCYTE [DISTWIDTH] IN BLOOD BY AUTOMATED COUNT: 14.9 % (ref 11.5–14.5)
GRANULOCYTES NFR BLD: 66.8 % (ref 42.2–75.2)
HCT VFR BLD CALC: 38.9 % (ref 37–47)
LYMPHOCYTES # BLD: 1.5 /CUMM (ref 1.2–3.4)
MCH RBC QN AUTO: 31.1 PG (ref 27–31)
MCHC RBC AUTO-ENTMCNC: 33.1 G/DL (ref 33–37)
MCV RBC AUTO: 94.1 FL (ref 81–99)
MONOCYTES # BLD: 0.3 /CUMM (ref 0.1–0.6)
PLATELET # BLD: 158 /CUMM (ref 130–400)
PMV BLD AUTO: 9.3 FL (ref 7.4–10.4)
RED BLOOD CELL CT: 4.13 /CUMM (ref 4.2–5.4)
WBC # BLD AUTO: 6.2 /CUMM (ref 4.8–10.8)

## 2018-05-26 NOTE — ED GI/GU/ABDOMINAL COMPLAINT
History of Present Illness
 
General
Chief Complaint: Abdominal Pain/Flank Pain
Stated Complaint: ABD PAIN AND BACK PAIN
Source: patient, old records
Exam Limitations: no limitations
 
Vital Signs & Intake/Output
Vital Signs & Intake/Output
 Vital Signs
 
 
Date Time Temp Pulse Resp B/P B/P Pulse O2 O2 Flow FiO2
 
     Mean Ox Delivery Rate 
 
1957 98.3 92 18 124/78  97 Room Air  
 
 1711 97.9 101 20 127/83  98 Room Air  
 
 
 
Allergies
Coded Allergies:
buspirone (From BUSPAR) (Intermediate, PER PT UNKNOWN STATES DOES NOT EXPERIENCE
DYSPNEA 16)
bupropion (From WELLBUTRIN) (Mild, JITTERY 17)
  AS PER . -CG 17 1613
 
Reconcile Medications
Cholecalciferol (Vitamin D3) (Vitamin D) 2,000 UNIT CAPSULE   1 CAP PO DAILY 
SUPPLEMENT  (Reported)
Ciprofloxacin HCl (Cipro) 500 MG TABLET   1 TAB PO BID UTI
Cyclobenzaprine HCl 5 MG TABLET   1 TAB PO TIDPRN PRN pain
Cyclobenzaprine HCl 5 MG TABLET   1 TAB PO TIDPRN PRN PAIN
Ibuprofen 800 MG TABLET   1 TAB PO TID pain
Lithium Carbonate 300 MG CAPSULE   300 MG PO 0800,2000 mood stabilization
Methimazole 10 MG TABLET   1 TAB PO DAILY THYROID  (Reported)
Pantoprazole Sodium 40 MG TABLET.DR   1 TAB PO DAILY GI  (Reported)
Phenazopyridine HCl (Pyridium) 200 MG TABLET   1 TAB PO TID dysuria
Propranolol HCl 20 MG TABLET   1 TAB PO BID TREMORS  (Reported)
Quetiapine Fumarate (Seroquel) 100 MG TABLET   250 MG PO QPM MENTAL HEALTH  (
Reported)
Quetiapine Fumarate (Seroquel) 50 MG TABLET   50 MG PO BID clarify thinking
Sertraline HCl (Zoloft) 100 MG TABLET   2 TAB PO DAILY MENTAL HEALTH  (Reported)
Topiramate (Topamax) 100 MG TABLET   1 TAB PO BID MIGRAINES  (Reported)
 
Triage Note:
REPORTS RLQ PAIN THAT STARTED THIS AFTERNOON AND
ITS NOW RADIATING TO HER BACK. REPORTS DIFFICULTY
PASSSING HER URINE AND SLIGHT BURNING UPON
URINATION.
Triage Nurses Notes Reviewed? yes
Pregnant? n
Is pt currently breastfeeding? No
Onset: Abrupt
Duration: day(s): (1), constant
Timing: recent history
Quality/Severity: aching
Severity Numbers: 5
Location: right lower quadrant
Radiation: back
Activities at Onset: none
Prior Abdominal Problems: similar symptoms
No Modifying Factors: none
Associated Symptoms: denies
HPI:
 
57-year-old female history of kidney stones bipolar disease hypertension 
presents to ER evaluation after developing right lower quadrant abdominal pain 
radiating to her back since this morning.  She was seen by myself 5 days ago 
which time she was having lower back pain urinary frequency urgency.  Urine 
culture showed no growth however she was sent home with ibuprofen and Pyridium. 
Symptoms persist no recent trauma.  She denies saddle anesthesia no leg numbness
or tingling.  Abdominal surgeries only significant for hysterectomy.  No fever 
nausea vomiting diarrhea
 
 
(Ciro Dupont)
 
Past History
 
Travel History
Traveled to [facility] past 21 day No
 
Medical History
Any Pertinent Medical History? see below for history
Neurological: migraine
EENT: NONE
Cardiovascular: hypertension, hyperlipidemia
Respiratory: NONE
Gastrointestinal: GERD
Hepatic: NONE
Renal: RENAL STONES W/ STENTS
Musculoskeletal: NONE
Psychiatric: bipolar disease, substance abuse (use of cocaine, free-basing), 
PTSD
Endocrine: hyperthyroidism
Blood Disorders: NONE
Cancer(s): basal cell carcinoma (lesion R temple( in [place])), skin cancer
GYN/Reproductive: PID
History of MRSA: No
History of VRE: No
History of CDIFF: No
 
Surgical History
Surgical History: hysterectomy
 
Psychosocial History
Who do you live with Patient/Self
Services at Home None
What is your primary language English
Tobacco Use: Current Daily Use
Daily Tobacco Use Amount/Type: => 5 Cigarettes daily
 
Family History
Family History, If Any:
MOTHER
  FH: hypertension
  FHx: hyperthyroidism
FATHER
Relation not specified for:
  Kidney stones
 
Hx Contributory? No
(Ciro Dupont)
 
Review of Systems
 
Review of Systems
Constitutional:
Reports: see HPI. 
Comments
Review of systems: See HPI, All other systems negative.
Constitutional, no chills no fever, no malaise no weight loss
HEENT:  no sore throat no congestion, no ear pain
Cardiovascular: No chest pain , no palpitation
Skin:  no rashes, no change in skin
Respiratory: No dyspnea no cough no  sputum no  hemoptysis
GI: No nausea no vomiting, no diarrhea, no bloating/constipation
: No dysuria No hematuria, no frequency
Muscle skeletal: No joint pain, no back pain, no neck pain,
Neurologic: , no headache
Psych: No stress no  depression,.
Heme/endocrine: No bruising no bleeding
Immunology: No lymphadenopathy
(Ciro Dupont)
 
Physical Exam
 
Physical Exam
General Appearance: well developed/nourished, no apparent distress, alert
Gastrointestinal: soft
Comments:
Well-developed well-nourished person in no acute distress
HEENT: Normal EENT exam; PERRL, EOMI. HEAD is atraumatic. moist mucous 
membranes.  
Neck: Supple,  normal range of motion
Back: Nontender, no CVA tenderness. Full range of motion
Cardiovascular: Regular rate and rhythms no murmurs rub
Respiratory: Chest nontender.There were no bony deformities, no asymmetry. No 
respiratory distress.  Patient speaking in full complete sentences. Breath 
sounds clear to auscultation bilaterally: NO W/R/R
Abdomen: Soft, nontender nondistended, no appreciable organomegaly. Normal bowel
sounds. No rebound/guarding, No appreciable enlargement of the abdominal aorta, 
No ascites.
Extremity: No edema, full range of motion of extremities
Neuro: Alert oriented x3, motor sensory normal. There were no obvious focal 
neurologic abnormalities.
Skin: No appreciable rash on exposed skin, skin is warm and dry.
Psych: Mood and affect is normal, memory and judgment is normal.
 
Core Measures
ACS in differential dx? No
Sepsis Present: No
Sepsis Focused Exam Completed? No
(Ciro Dupont)
 
Progress
Differential Diagnosis: appendicitis, biliary colic, bowel obstruction, colon 
cancer, cholecystitis, diverticulitis, gastritis, hepatitis, hernia, inflamm 
bowel dis, kidney stone, peptic ulcer, perforated viscous, SBO
Plan of Care:
 Orders
 
 
Procedure Date/time Status
 
URINALYSIS 1713 Complete
 
COMPREHENSIVE METABOLIC PANEL 1712 Complete
 
CBC WITHOUT DIFFERENTIAL 1712 Complete
 
 
 Laboratory Tests
 
 
 
18 1845:
Anion Gap 8, Estimated GFR 47  L, BUN/Creatinine Ratio 10.0, Glucose 82, Calcium
9.3, Total Bilirubin 0.5, AST 11  L, ALT 17, Alkaline Phosphatase 61, Total 
Protein 5.8  L, Albumin 3.3  L, Globulin 2.5, Albumin/Globulin Ratio 1.3, CBC w 
Diff NO MAN DIFF REQ, RBC 4.13  L, MCV 94.1, MCH 31.1  H, MCHC 33.1, RDW 14.9  H
, MPV 9.3, Gran % 66.8, Lymphocytes % 24.9, Monocytes % 5.0, Eosinophils % 2.7, 
Basophils % 0.6, Absolute Granulocytes 4.1, Absolute Lymphocytes 1.5, Absolute 
Monocytes 0.3, Absolute Eosinophils 0.2, Absolute Basophils 0
 
18 1820:
Urine Color ICTRC  H, Urine Clarity HAZY  H, Urine pH 6.0, Ur Specific Gravity 
1.020, Urine Protein TRACE  H, Urine Ketones TRACE  H, Urine Nitrite POS  H, 
Urine Bilirubin NEG@ICTO, Urine Urobilinogen 1.0, Ur Leukocyte Esterase SMALL  H
, Ur Microscopic SEDIMENT EXAMINED, Urine RBC 10-15  H, Urine WBC 1-3  H, Ur 
Epithelial Cells MANY  H, Urine Bacteria MANY  H, Urine Hemoglobin MOD  H, Urine
Glucose NEG
Labs ordered old records reviewed including the patient's urine culture from 
earlier this week.
 
 
 
 Myles reports to feeling improved pending labs
 
 
2018 7:41:38 PM  
I discussed with the Myles at length her results.  Her pain has resolved she 
is feeling improved no nausea.  She reports the Flexeril helps her when she was 
here the other day, I offered her stronger pain medication with the 
understanding at the Flexeril is only a muscle relaxer which she declined.  
Cipro will be provided as well the urine culture from 4 days ago showed no 
growth.  I had an extensive conversation regarding need for close follow up with
their primary care physician/urology this week as well as return precautions.  I
answered all of their questions, they feel comfortable with the plan and follow-
up care. 
 
 
I discussed with the patient the medications that they will receive. I gave them
signs and symptoms that could indicate an adverse reaction. I have advised them 
to limit their activities until they can see how they respond to the medication.
 
 
Diagnostic Imaging:
Viewed by Me: CT Scan.  Discussed w/RAD: CT Scan. 
Radiology Impression: PATIENT: MYLES CARDOZA  MEDICAL RECORD NO: [id number] 
PRESENT AGE: 57  PATIENT ACCOUNT NO: [id number] : 62  LOCATION: [facility] 
ORDERING PHYSICIAN: Ciro HAYWARD     SERVICE DATE:  EXAM TYPE: 
CAT - CT ABD & PELVIS W/O IV CONTRAS EXAMINATION: CT ABDOMEN AND PELVIS WITHOUT 
CONTRAST CLINICAL INFORMATION: Abdominal pain. COMPARISON: 2015 TECHNIQUE:
Multidetector volumetric imaging was performed from the superior aspect of the 
liver through the pubic symphysis. Sagittal and coronal reformatted images were 
obtained on the technologist's workstation. DLP: 323.72 mGy-cm FINDINGS: LUNG 
BASES: Limited images of lower thorax demonstrate mild atelectatic changes at 
the lung bases bilaterally. Noncontrast images of the abdomen and pelvis 
demonstrate: LIVER, GALLBLADDER, AND BILIARY TREE: The liver is normal in size, 
shape, and attenuation. No biliary ductal dilatation is present. The gallbladder
is unremarkable with no evidence of radiopaque gallstones, gallbladder wall 
thickening, or obvious pericholecystic inflammatory changes. PANCREAS: 
Unremarkable. SPLEEN: Unremarkable. ADRENAL GLANDS: Unremarkable. KIDNEYS AND 
URETERS: The kidneys are normal in size, shape. There are numerous calcific 
densities in both kidneys, along the corticomedullary junction. The 
calcifications most likely represent a combination of renal stones with changes 
of nephrocalcinosis. The largest stone measures about 0.7 cm and is located in 
mid part of the left kidney. Axial image 38 from series 2. There is no 
hydronephrosis. No perinephric fat stranding. The ureters are not dilated. No 
ureter stone. The urinary bladder is empty. There are multiple pelvic 
phleboliths. GASTROINTESTINAL TRACT: The small and large bowel are unremarkable.
The appendix is visualized. The lumen of the appendix contains dense material, 
representing appendicolith. There are no periappendiceal inflammatory changes to
suggest acute appendicitis. ABDOMINAL WALL: No significant hernia is 
appreciated. LYMPH NODES: Normal. VASCULAR: Atherosclerotic calcifications of 
the abdominal aorta noted. No aneurysmal dilatation. PELVIC VISCERA: The uterus 
is not present, prior hysterectomy. No large adnexal mass. OSSEOUS STRUCTURES: 
Multilevel degenerative changes of the lumbar spine, particularly at L3-L4 and 
L5-S1 and T12-L1 levels noted. IMPRESSION: Numerous bilateral calcific densities
in both kidneys, predominantly at the corticomedullary junction. The 
calcifications represent a combination of renal stones in addition to changes of
nephrocalcinosis. Clinical and lab correlation is suggested. The largest stone 
measures about 0.7 cm in mid part of the left kidney without evidence of 
obstruction. DICTATED BY: Orlando Alexis MD  DATE/TIME DICTATED:18 
:[employer]  DATE/TIME TRANSCRIBED:18 CONFIDENTIAL, 
DO NOT COPY WITHOUT APPROPRIATE AUTHORIZATION.  <Electronically signed in Other 
Vendor System>                                                                  
                     SIGNED BY: Orlando Alexis MD 18 [id number]
Initial ED EKG: none
(Ciro Dupont)
 
Departure
 
Departure
Time of Disposition: 
Disposition: HOME OR SELF CARE
Condition: Stable
Clinical Impression
Primary Impression: Abdominal pain
Secondary Impressions: Back pain, Renal stone
Referrals:
Feroz FERNANDEZ,Kavitha Carrasco MD,Myl (PCP/Family)
 
Additional Instructions:
FOLLOW UP WITH UROLOGIST DR SINGH AS WELL AS YOUR PMD NEXT WEEK. FLEXERIL AS 
DIRECTED. CONTINUE TAKING IBUPROFEN FOR PAIN. CIPRO FOR UTI. RETURN WITH ANY 
CONCERNS
Departure Forms:
Customer Survey
General Discharge Information
Prescriptions:
Current Visit Scripts
Ciprofloxacin HCl (Cipro) 1 TAB PO BID  
     #14 TAB 
 
Cyclobenzaprine HCl 1 TAB PO TIDPRN PRN PAIN 
     #10 TAB 
 
 
(Ciro Dupont)
 
PA/NP Co-Sign Statement
Statement:
ED Attending supervision documentation-
 
[] I saw and evaluated the patient. I have also reviewed all the pertinent lab 
results and diagnostic results. I agree with the findings and the plan of care 
as documented in the PA's/NP's documentation. 
 
[X] I have reviewed the ED Record and agree with the PA's/NP's documentation.
 
[] Additions or exceptions (if any) to the PAs/NP's note and plan are 
summarized below:
[]
 
(Katy FERNANDEZ,Caleb KEY)

## 2018-05-26 NOTE — CT SCAN REPORT
EXAMINATION:
CT ABDOMEN AND PELVIS WITHOUT CONTRAST
 
CLINICAL INFORMATION:
Abdominal pain.
 
COMPARISON:
11/16/2015
 
TECHNIQUE:
Multidetector volumetric imaging was performed from the superior aspect of
the liver through the pubic symphysis. Sagittal and coronal reformatted
images were obtained on the technologist's workstation.
 
DLP:
323.72 mGy-cm
 
FINDINGS:
 
LUNG BASES: Limited images of lower thorax demonstrate mild atelectatic
changes at the lung bases bilaterally.
 
Noncontrast images of the abdomen and pelvis demonstrate:
 
LIVER, GALLBLADDER, AND BILIARY TREE: The liver is normal in size, shape, and
attenuation. No biliary ductal dilatation is present. The gallbladder is
unremarkable with no evidence of radiopaque gallstones, gallbladder wall
thickening, or obvious pericholecystic inflammatory changes.
 
PANCREAS: Unremarkable.
 
SPLEEN: Unremarkable.
 
ADRENAL GLANDS: Unremarkable.
 
KIDNEYS AND URETERS: The kidneys are normal in size, shape. There are
numerous calcific densities in both kidneys, along the corticomedullary
junction. The calcifications most likely represent a combination of renal
stones with changes of nephrocalcinosis. The largest stone measures about 0.7
cm and is located in mid part of the left kidney. Axial image 38 from series
2. There is no hydronephrosis. No perinephric fat stranding. The ureters are
not dilated. No ureter stone. The urinary bladder is empty. There are
multiple pelvic phleboliths.
 
GASTROINTESTINAL TRACT: The small and large bowel are unremarkable. The
appendix is visualized. The lumen of the appendix contains dense material,
representing appendicolith. There are no periappendiceal inflammatory changes
to suggest acute appendicitis.
 
ABDOMINAL WALL: No significant hernia is appreciated.
 
LYMPH NODES: Normal.
 
VASCULAR: Atherosclerotic calcifications of the abdominal aorta noted. No
aneurysmal dilatation.
 
PELVIC VISCERA: The uterus is not present, prior hysterectomy. No large
adnexal mass.
 
OSSEOUS STRUCTURES: Multilevel degenerative changes of the lumbar spine,
particularly at L3-L4 and L5-S1 and T12-L1 levels noted.
 
IMPRESSION:
Numerous bilateral calcific densities in both kidneys, predominantly at the
corticomedullary junction. The calcifications represent a combination of
renal stones in addition to changes of nephrocalcinosis. Clinical and lab
correlation is suggested. The largest stone measures about 0.7 cm in mid part
of the left kidney without evidence of obstruction.

## 2018-05-28 ENCOUNTER — HOSPITAL ENCOUNTER (EMERGENCY)
Dept: HOSPITAL 68 - ERH | Age: 58
End: 2018-05-28
Payer: COMMERCIAL

## 2018-05-28 VITALS — WEIGHT: 160 LBS | HEIGHT: 68 IN | BODY MASS INDEX: 24.25 KG/M2

## 2018-05-28 VITALS — SYSTOLIC BLOOD PRESSURE: 138 MMHG | DIASTOLIC BLOOD PRESSURE: 93 MMHG

## 2018-05-28 DIAGNOSIS — K52.9: Primary | ICD-10-CM

## 2018-05-28 NOTE — ED GI/GU/ABDOMINAL COMPLAINT
History of Present Illness
 
General
Chief Complaint: Abdominal Pain/Flank Pain
Stated Complaint: PER PT KIDNEY STONES? ABD PAIN RADIATING INTO LEGS
Source: patient, old records
Exam Limitations: no limitations
 
Vital Signs & Intake/Output
Vital Signs & Intake/Output
 Vital Signs
 
 
Date Time Temp Pulse Resp B/P B/P Pulse O2 O2 Flow FiO2
 
     Mean Ox Delivery Rate 
 
05/28 0807       Room Air Room Air 
 
05/28 0714 96.3 80 16 138/93  96 Room Air  
 
 
 
Allergies
Coded Allergies:
buspirone (From BUSPAR) (Intermediate, PER PT UNKNOWN STATES DOES NOT EXPERIENCE
DYSPNEA 12/08/16)
bupropion (From WELLBUTRIN) (Mild, JITTERY 05/05/17)
  AS PER . -CG 5/5/17 1613
 
Reconcile Medications
Cholecalciferol (Vitamin D3) (Vitamin D) 2,000 UNIT CAPSULE   1 CAP PO DAILY 
SUPPLEMENT  (Reported)
Ciprofloxacin HCl (Cipro) 500 MG TABLET   1 TAB PO BID UTI
Cyclobenzaprine HCl 5 MG TABLET   1 TAB PO TIDPRN PRN pain
Cyclobenzaprine HCl 5 MG TABLET   1 TAB PO TIDPRN PRN PAIN
Hyoscyamine Sulfate (Levsin-Sl) 0.125 MG TAB.SUBL   1-2 TAB SL Q4P PRN abdominal
cramps
Ibuprofen 800 MG TABLET   1 TAB PO TID pain
Lithium Carbonate 300 MG CAPSULE   300 MG PO 0800,2000 mood stabilization
Loperamide HCl (Imodium A-D) 2 MG TABLET   0 PO SEE ADMIN CRITERIA PRN diarrhea
     1 tab after each loose stool up to 7 per day
Methimazole 10 MG TABLET   1 TAB PO DAILY THYROID  (Reported)
Ondansetron (Zofran Odt) 4 MG TAB.RAPDIS   1 TAB SL TID PRN nausea vomiting
Pantoprazole Sodium 40 MG TABLET.   1 TAB PO DAILY GI  (Reported)
Phenazopyridine HCl (Pyridium) 200 MG TABLET   1 TAB PO TID dysuria
Propranolol HCl 20 MG TABLET   1 TAB PO BID TREMORS  (Reported)
Quetiapine Fumarate (Seroquel) 100 MG TABLET   250 MG PO QPM MENTAL HEALTH  (
Reported)
Quetiapine Fumarate (Seroquel) 50 MG TABLET   50 MG PO BID clarify thinking
Sertraline HCl (Zoloft) 100 MG TABLET   2 TAB PO DAILY MENTAL HEALTH  (Reported)
Topiramate (Topamax) 100 MG TABLET   1 TAB PO BID MIGRAINES  (Reported)
 
Triage Note:
56 Y/O FEMALE C/O "PROFUSE" ABDOMINAL AND LOW BACK
PAIN RADIATING DOWN BILATERAL LEGS.  STATES SHE
HAS KNOWN KIDNEY STONES AND IS TAKING FLEXERIL FOR
SAME (LAST DOSE 30 MIN AGO).  REPORTS HAVING 1
EPISODE DIARRHEA THIS AM AND WORSENING PAIN SINCE.
+NAUSEA.  AFEBRILE
Triage Nurses Notes Reviewed? yes
LMP (ages 10-50): post menopausal
Pregnant? n
Is pt currently breastfeeding? No
Onset: Just prior to arrival
Duration: hour(s):, constant, continues in ED
Timing: recent history
Quality/Severity: aching, moderate
Location: generalized abdomen
Radiation: back, Legs
Activities at Onset: sleep
Prior Abdominal Problems: similar symptoms
Past Sexual History: Unobtainable at this time
No Modifying Factors: none
Associated Symptoms: abdominal pain, diarrhea, loss of appetite, nausea/vomiting
HPI:
1 day prior to admission patient complains of increasing generalized abdominal 
discomfort and right flank pain described as crampy waxing and waning radiating 
to her thighs associated with anorexia nausea loose watery stools.
She denies fever chills chest pain cough shortness breath headache dysuria rash 
bleeding.
 
Past History
 
Travel History
Traveled to [place] past 21 day No
 
Medical History
Any Pertinent Medical History? see below for history
Neurological: migraine
EENT: NONE
Cardiovascular: hypertension, hyperlipidemia
Respiratory: NONE
Gastrointestinal: GERD
Hepatic: NONE
Renal: RENAL STONES W/ STENTS
Musculoskeletal: NONE
Psychiatric: bipolar disease, substance abuse (use of cocaine, free-basing), 
PTSD
Endocrine: hyperthyroidism
Blood Disorders: NONE
Cancer(s): basal cell carcinoma (lesion R temple( in [place])), skin cancer
GYN/Reproductive: PID
History of MRSA: No
History of VRE: No
History of CDIFF: No
 
Surgical History
Surgical History: hysterectomy
 
Psychosocial History
Who do you live with Patient/Self
Services at Home None
What is your primary language English
Tobacco Use: Current Daily Use
Daily Tobacco Use Amount/Type: => 5 Cigarettes daily
 
Family History
Family History, If Any:
MOTHER
  FH: hypertension
  FHx: hyperthyroidism
FATHER
Relation not specified for:
  Kidney stones
 
Hx Contributory? No
 
Review of Systems
 
Review of Systems
Constitutional:
Reports: no symptoms. 
EENTM:
Reports: no symptoms. 
Respiratory:
Reports: no symptoms. 
Cardiovascular:
Reports: no symptoms. 
GI:
Reports: see HPI, abdominal pain, diarrhea, nausea. 
Genitourinary:
Reports: no symptoms. 
Musculoskeletal:
Reports: see HPI, back pain. 
Skin:
Reports: no symptoms. 
Neurological/Psychological:
Reports: no symptoms. 
Hematologic/Endocrine:
Reports: no symptoms. 
Immunologic/Allergic:
Reports: no symptoms. 
All Other Systems: Reviewed and Negative
 
Physical Exam
 
Physical Exam
General Appearance: well developed/nourished, alert, awake, anxious, mild 
distress, obese
Head: atraumatic, normal appearance
Eyes:
Bilateral: normal appearance, PERRL, EOMI, normal inspection. 
Ears, Nose, Throat, Mouth: hearing grossly normal, moist mucous membrane
Neck: normal inspection, supple, full range of motion, normal alignment
Respiratory: normal breath sounds, chest non-tender, no respiratory distress, 
quiet respiration, lungs clear
Cardiovascular: regular rate/rhythm, normal peripheral pulses, norml femoral 
pulses equa
Peripheral Pulses:
4+ carotid (R), 4+ carotid (L)
Gastrointestinal: soft, non-tender, no organomegaly, abnormal bowel sounds
Back: normal inspection, normal range of motion, no vertebral tenderness
Extremities: normal range of motion, no ligament instability
Neurologic/Psych: no motor/sensory deficits, awake, alert, oriented x 3, normal 
gait, CNs II-XII nml as tested, depressed affect
Skin: intact, normal color, warm/dry
 
Core Measures
ACS in differential dx? No
Sepsis Present: No
Sepsis Focused Exam Completed? No
 
Progress
Differential Diagnosis: biliary colic, gastritis, PUD/GERD
Plan of Care:
 Current Medications
 
 
  Sig/Jerel Start time  Last
 
Medication Dose  Stop Time Status Admin
 
Sodium Chloride 1,000 ML BOLUS ONE 05/28 0745 CAN 
 
(Normal Saline 0.9%)   05/28 0844  
 
 
 Laboratory Tests
 
 
 
05/28/18 0742:
CBC w Diff Cancelled, WBC Cancelled, RBC Cancelled, Hgb Cancelled, Hct Cancelled
, MCV Cancelled, MCH Cancelled, MCHC Cancelled, RDW Cancelled, Plt Count 
Cancelled, MPV Cancelled, Lithium Cancelled, Urine Color Cancelled, Urine 
Clarity Cancelled, Urine pH Cancelled, Ur Specific Gravity Cancelled, Urine 
Protein Cancelled, Urine Ketones Cancelled, Urine Nitrite Cancelled, Urine 
Bilirubin Cancelled, Urine Urobilinogen Cancelled, Ur Leukocyte Esterase 
Cancelled, Ur Microscopic Cancelled, Urine Hemoglobin Cancelled, Urine Glucose 
Cancelled
 
Initial ED EKG: none
Comments:
After ordering diagnostic and therapeutic interventions patient said her 
discomfort is gone and decided to defer evaluation.
 
Departure
 
Departure
Time of Disposition: 0755
Disposition: HOME OR SELF CARE
Condition: Stable
Clinical Impression
Primary Impression: Gastroenteritis
Referrals:
Luna FERNANDEZ,Myl (PCP/Family)
 
Departure Forms:
Customer Survey
General Discharge Information
Prescriptions:
Current Visit Scripts
Loperamide HCl (Imodium A-D) 0  PO SEE ADMIN CRITERIA PRN diarrhea 
     #24 TAB 
     1 tab after each loose stool up to 7 per day
 
Ondansetron (Zofran Odt) 1 TAB SL TID PRN nausea vomiting 
     #10 TAB 
 
Hyoscyamine Sulfate (Levsin-Sl) 1-2 TAB SL Q4P PRN abdominal cramps 
     #30 TAB

## 2018-06-04 ENCOUNTER — HOSPITAL ENCOUNTER (OUTPATIENT)
Dept: HOSPITAL 68 - STS | Age: 58
End: 2018-06-04
Attending: UROLOGY
Payer: COMMERCIAL

## 2018-06-04 DIAGNOSIS — R31.0: ICD-10-CM

## 2018-06-04 DIAGNOSIS — F17.200: ICD-10-CM

## 2018-06-04 DIAGNOSIS — R35.0: ICD-10-CM

## 2018-06-04 DIAGNOSIS — Z87.442: ICD-10-CM

## 2018-06-04 DIAGNOSIS — N20.0: Primary | ICD-10-CM

## 2018-06-04 DIAGNOSIS — N32.89: ICD-10-CM

## 2018-06-04 DIAGNOSIS — E07.9: ICD-10-CM

## 2018-06-04 NOTE — OPERATIVE REPORT
Operative/Inv Procedure Report
Surgery Date: 06/04/18
Name of Procedure:
LEFT ESWL
Pre-Operative Diagnosis:
BILATERAL STONES
Post-Operative Diagnosis:
same
Estimated Blood Loss: scant
Surgeon/Assistant:
Kavitha Redman MD
 
Anesthesia: local monitored anesthesi
Complications:
none
Condition:
stable
Operative Indication:
left renal colic
 
Operative/Procedure Note
Note:
56yo female with a hx of bilateral kidney stones.  Patient with left renal 
colic.  She has had ESWL in the past and knows the risks, benefits and 
alternatives but these were reiterated in the office and holding area.  All 
questions were answered.  She has been taking ibuprofen and let her know the 
increased risk of bleeding with the ESWL because of this intake.  She accepts 
this risk. 
 
Patient was taken to the operating room and place on the ESWL room table in the 
supine position. Timeout was performed.  IV antibiotics were given.  IV sedation
was started after she was optimally positioned.  The stones were easily 
identified on th US imaging and it was also visible on fluoroscopy but less so 
for picture results.  The ESWL was started at power of 1-15 for 500 shocks 
followed by power of 16-19 for 500 shocks and then power of 20 for 1500 shocks. 
The stone was visibly changed at the end of the session.  Patient tolerated the 
procedure well.  She was transferred to the recovery room in stable condition. 
Findings:
bilateral renal stones on CT and 2 larger stones on US imaging, mid and upper 
pole about 7-8mm
Discharge Disposition: Same Day Admissions
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
